# Patient Record
Sex: MALE | Race: BLACK OR AFRICAN AMERICAN
[De-identification: names, ages, dates, MRNs, and addresses within clinical notes are randomized per-mention and may not be internally consistent; named-entity substitution may affect disease eponyms.]

---

## 2018-03-19 ENCOUNTER — HOSPITAL ENCOUNTER (INPATIENT)
Dept: HOSPITAL 36 - GERO | Age: 68
LOS: 15 days | Discharge: HOME | DRG: 885 | End: 2018-04-03
Attending: PSYCHIATRY & NEUROLOGY | Admitting: PSYCHIATRY & NEUROLOGY
Payer: MEDICARE

## 2018-03-19 VITALS — DIASTOLIC BLOOD PRESSURE: 75 MMHG | SYSTOLIC BLOOD PRESSURE: 170 MMHG

## 2018-03-19 DIAGNOSIS — H40.9: ICD-10-CM

## 2018-03-19 DIAGNOSIS — I10: ICD-10-CM

## 2018-03-19 DIAGNOSIS — F31.5: Primary | ICD-10-CM

## 2018-03-19 DIAGNOSIS — Z79.82: ICD-10-CM

## 2018-03-19 DIAGNOSIS — M19.90: ICD-10-CM

## 2018-03-19 PROCEDURE — Z7610: HCPCS

## 2018-03-19 PROCEDURE — G0410 GRP PSYCH PARTIAL HOSP 45-50: HCPCS

## 2018-03-20 NOTE — DIAGNOSTIC IMAGING REPORT
CHEST X-RAY: AP view



INDICATION: Pneumonia



COMPARISON: None



FINDINGS: There appear to be overlying including material limiting the

exam.  There is no focal consolidation or pleural effusions The heart is

normal in size. The osseous structures demonstrate no acute

abnormalities.



IMPRESSION: 



Overlying clothing material, limiting the exam.  No focal consolidation

is identified.

## 2018-03-20 NOTE — HISTORY AND PHYSICAL
History of Present Illness





- HPI


Chief Complaint: 


agitation 











HPI: 





This is a 8 year old male who is admitted from home due to agitation towards 

family members. 


Vital Signs: 





 Last Vital Signs











Temp  97.6 F   03/20/18 15:18


 


Pulse  65   03/20/18 15:18


 


Resp  20   03/20/18 15:18


 


BP  176/79   03/20/18 15:18


 


Pulse Ox  97   03/20/18 15:18














Past Medical History


Cardiovascular: Report: HTN





Social History


Smoke: No


Alcohol: None


Drugs: None


Lives: With Family





- Medications


Home Medications: 


Home Medication











 Medication  Instructions  Recorded  Type


 


Aspirin [Ecotrin] 81 mg PO DAILY 03/19/18 History


 


Latanoprost 0.005% Ophth Soln 1 drop EACH EYE HS 03/19/18 History





[Xalatan 0.005% Ophth Soln]   


 


Metoprolol Tartrate [Lopressor] 25 mg PO BID 03/19/18 History


 


QUEtiapine Fumarate [SEROquel] 800 mg PO HS 03/19/18 History


 


amLODIPine Besylate [Norvasc*] 10 mg PO DAILY 03/19/18 History














- Allergies


Allergies/Adverse Reactions: 


 Allergies











Allergy/AdvReac Type Severity Reaction Status Date / Time


 


No Known Allergies Allergy   Verified 03/19/18 17:43














Review of Systems





- Review of Systems


Constitutional: Report: No Significant


Eyes: Report: No Significant


Respiratory: Report: No Significant


Cardiovascular: Report: No Significant


Neurological: Report: No Significant





Physical Exam





- Physical Exam


HEENT: Report: Ears Nose Throat within normal limits


Neck: Report: Within normal limits


Cardiovascular Systems: Report: +s1/s2 noted, Regular, Rate and Rhythm


Respiratory: Report: Breath Sounds are within normal limits


Abdomen: Report: Non-tender to palpation


Skin: Report: Color of skin is within normal limits


Neuro/Psych: Report: Mood affect is within normal limits





- Assessment


Assessment: 





agitation


htn 








- Plan


Plan: 





continue current orders
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
## 2018-03-21 NOTE — PROGRESS NOTES
DATE:  



SUBJECTIVE:  Chart reviewed and the patient interviewed.  I also discussed the

patient's condition with the staff and reviewed records and labs.  The patient

is still hallucinating and the patient said that "there is liquid coming out of

my nipples."  The patient also is still restless and is still anxious.  He also

has been having episodes of confusion and needed redirections.  The patient also

still has episodes of anger and trying to hit others.  Otherwise, the patient is

compliant with taking his medications and the patient is started on Seroquel 800

mg at bedtime with no side effects.



ASSESSMENT:  The patient is still agitated and needs close monitoring and he

still can be dangerous to others.



TREATMENT PLAN:  Continue monitoring his behavior and his condition closely. 

Also, continue to work on his irritability and ineffective coping and continue

to follow up.





DD: 03/21/2018 06:28

DT: 03/21/2018 07:10

[facility]# [id number]  [id number]

## 2018-03-21 NOTE — GENERAL PROGRESS NOTE
Subjective





- Review of Systems


Events since last encounter: 





still irritable


otherwise no distress 





Objective





- Physical Exam


Vitals and I&O: 


 Vital Signs











Temp  97.6 F   03/21/18 06:44


 


Pulse  73   03/21/18 06:44


 


Resp  20   03/21/18 06:44


 


BP  155/74   03/21/18 06:44


 


Pulse Ox  98   03/21/18 06:44








 Intake & Output











 03/20/18 03/21/18 03/21/18





 18:59 06:59 18:59


 


Intake Total 1200 240 


 


Balance 1200 240 


 


Intake:   


 


  Oral 1200 240 


 


Other:   


 


  # Voids 3 2 


 


  Stool Characteristics  Soft 











Active Medications: 


Current Medications





Acetaminophen (Tylenol)  650 mg PO Q4HR PRN


   PRN Reason: Mild Pain / Temp above 100


   Stop: 05/18/18 18:35


Al Hydrox/Mg Hydrox/Simethicone (Maalox)  30 ml PO Q4HR PRN


   PRN Reason: GI DISTRESS


   Stop: 05/18/18 18:35


Amlodipine Besylate (Norvasc)  10 mg PO DAILY [facility]


   Stop: 05/19/18 08:59


   Last Admin: 03/20/18 08:37 Dose:  10 mg


Aspirin (Ecotrin)  81 mg PO DAILY [facility]


   Stop: 05/19/18 08:59


   Last Admin: 03/20/18 08:38 Dose:  81 mg


Latanoprost (Xalatan 0.005% Ophth Soln)  1 drop EACH EYE HS [facility]


   Stop: 05/18/18 20:59


   Last Admin: 03/20/18 21:02 Dose:  Not Given


Lorazepam (Ativan)  0.5 mg PO Q4HR PRN; Protocol


   PRN Reason: Anxiety


   Stop: 04/18/18 18:35


Magnesium Hydroxide (Milk Of Magnesia)  30 ml PO HS PRN


   PRN Reason: Constipation


Metoprolol Tartrate (Lopressor)  25 mg PO BID [facility]


   Stop: 05/19/18 08:59


   Last Admin: 03/20/18 17:10 Dose:  25 mg


Multivitamins/Vitamin C (Theragran)  1 tab PO DAILY [facility]


   Stop: 05/19/18 08:59


   Last Admin: 03/20/18 08:38 Dose:  1 tab


Quetiapine Fumarate (Seroquel)  800 mg PO HS [facility]


   PRN Reason: Protocol


   Stop: 05/19/18 20:59


   Last Admin: 03/20/18 21:03 Dose:  800 mg


Zolpidem Tartrate (Ambien)  5 mg PO HS PRN


   PRN Reason: Insomnia


   Stop: 05/18/18 18:35


   Last Admin: 03/19/18 22:07 Dose:  5 mg

## 2018-03-22 NOTE — GENERAL PROGRESS NOTE
Subjective





- Review of Systems


Events since last encounter: 





patient awake


seems confused


in no distress





Objective





- Physical Exam


Vitals and I&O: 


 Vital Signs











Temp  96.9 F   03/22/18 06:45


 


Pulse  71   03/22/18 06:45


 


Resp  21   03/22/18 06:45


 


BP  132/70   03/22/18 06:45


 


Pulse Ox  98   03/22/18 06:45








 Intake & Output











 03/21/18 03/22/18 03/22/18





 18:59 06:59 18:59


 


Intake Total  1660 


 


Balance  1660 


 


Intake:   


 


  Oral  1660 


 


Other:   


 


  # Voids  3 


 


  Stool Characteristics  Soft 











Active Medications: 


Current Medications





Acetaminophen (Tylenol)  650 mg PO Q4HR PRN


   PRN Reason: Mild Pain / Temp above 100


   Stop: 05/18/18 18:35


Al Hydrox/Mg Hydrox/Simethicone (Maalox)  30 ml PO Q4HR PRN


   PRN Reason: GI DISTRESS


   Stop: 05/18/18 18:35


Amlodipine Besylate (Norvasc)  10 mg PO DAILY [facility]


   Stop: 05/19/18 08:59


   Last Admin: 03/21/18 09:49 Dose:  10 mg


Aspirin (Ecotrin)  81 mg PO DAILY [facility]


   Stop: 05/19/18 08:59


   Last Admin: 03/21/18 09:49 Dose:  81 mg


Latanoprost (Xalatan 0.005% Ophth Soln)  1 drop EACH EYE HS [facility]


   Stop: 05/18/18 20:59


   Last Admin: 03/21/18 20:12 Dose:  Not Given


Lorazepam (Ativan)  0.5 mg PO Q4HR PRN; Protocol


   PRN Reason: Anxiety


   Stop: 04/18/18 18:35


Magnesium Hydroxide (Milk Of Magnesia)  30 ml PO HS PRN


   PRN Reason: Constipation


Metoprolol Tartrate (Lopressor)  25 mg PO BID [facility]


   Stop: 05/19/18 08:59


   Last Admin: 03/21/18 16:28 Dose:  25 mg


Multivitamins/Vitamin C (Theragran)  1 tab PO DAILY [facility]


   Stop: 05/19/18 08:59


   Last Admin: 03/21/18 09:49 Dose:  1 tab


Quetiapine Fumarate (Seroquel)  800 mg PO HS [facility]


   PRN Reason: Protocol


   Stop: 05/19/18 20:59


   Last Admin: 03/21/18 20:23 Dose:  800 mg


Zolpidem Tartrate (Ambien)  5 mg PO HS PRN


   PRN Reason: Insomnia


   Stop: 05/18/18 18:35


   Last Admin: 03/19/18 22:07 Dose:  5 mg
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
## 2018-03-23 NOTE — INTERNAL MEDICINE PROG NOTE
Internal Medicine Subjective





- Subjective


Service Date: 03/23/18


Patient seen and examined:: with staff


Patient is:: awake


Per staff patient has:: tolerating meds





Internal Medicine Objective





- Physical Exam


Vitals and I&O: 


 Vital Signs











Temp  98.0 F   03/23/18 14:51


 


Pulse  72   03/23/18 14:51


 


Resp  18   03/23/18 14:51


 


BP  148/81   03/23/18 14:51


 


Pulse Ox  96   03/23/18 14:51








 Intake & Output











 03/22/18 03/23/18 03/23/18





 18:59 06:59 18:59


 


Intake Total 1200 260 


 


Balance 1200 260 


 


Intake:   


 


  Oral 1200 260 


 


Other:   


 


  # Voids 3 1 


 


  # Bowel Movements 1  


 


  Stool Characteristics Soft  











Active Medications: 


Current Medications





Acetaminophen (Tylenol)  650 mg PO Q4HR PRN


   PRN Reason: Mild Pain / Temp above 100


   Stop: 05/18/18 18:35


Al Hydrox/Mg Hydrox/Simethicone (Maalox)  30 ml PO Q4HR PRN


   PRN Reason: GI DISTRESS


   Stop: 05/18/18 18:35


Amlodipine Besylate (Norvasc)  10 mg PO DAILY [facility]


   Stop: 05/19/18 08:59


   Last Admin: 03/23/18 08:29 Dose:  10 mg


Aspirin (Ecotrin)  81 mg PO DAILY [facility]


   Stop: 05/19/18 08:59


   Last Admin: 03/23/18 08:29 Dose:  81 mg


Latanoprost (Xalatan 0.005% Ophth Soln)  1 drop EACH EYE HS [facility]


   Stop: 05/18/18 20:59


   Last Admin: 03/22/18 20:54 Dose:  Not Given


Lorazepam (Ativan)  0.5 mg PO Q4HR PRN; Protocol


   PRN Reason: Anxiety


   Stop: 04/18/18 18:35


   Last Admin: 03/23/18 09:26 Dose:  0.5 mg


Magnesium Hydroxide (Milk Of Magnesia)  30 ml PO HS PRN


   PRN Reason: Constipation


Metoprolol Tartrate (Lopressor)  25 mg PO BID [facility]


   Stop: 05/19/18 08:59


   Last Admin: 03/23/18 08:29 Dose:  25 mg


Multivitamins/Vitamin C (Theragran)  1 tab PO DAILY [facility]


   Stop: 05/19/18 08:59


   Last Admin: 03/23/18 08:29 Dose:  1 tab


Quetiapine Fumarate (Seroquel)  800 mg PO HS [facility]


   PRN Reason: Protocol


   Stop: 05/19/18 20:59


   Last Admin: 03/22/18 20:37 Dose:  800 mg


Zolpidem Tartrate (Ambien)  5 mg PO HS PRN


   PRN Reason: Insomnia


   Stop: 05/18/18 18:35


   Last Admin: 03/22/18 20:37 Dose:  5 mg








General: alert


HEENT: NC/AT, PERRLA


Neck: Supple


Cardiovascular: RRR


Abdomen: soft, non-tender, non-distended, positive bowel sound


Neurological: alert





Internal Medicine Assmt/Plan





- Assessment


Assessment: 





agitation


htn 








- Plan


Plan: 





continue current orders

## 2018-03-24 NOTE — GENERAL PROGRESS NOTE
Subjective





- Review of Systems


Events since last encounter: 





patient still agitated


irritable


denies pain 





Objective





- Physical Exam


Vitals and I&O: 


 Vital Signs











Temp  97.7 F   18 06:42


 


Pulse  81   18 09:05


 


Resp  21   18 06:42


 


BP  163/98   18 09:05


 


Pulse Ox  99   18 06:42








 Intake & Output











 18





 18:59 06:59 18:59


 


Intake Total 1200 480 


 


Balance 1200 480 


 


Intake:   


 


  Oral 1200 480 


 


Other:   


 


  # Voids 4 1 


 


  # Bowel Movements 1  











Active Medications: 


Current Medications





Acetaminophen (Tylenol)  650 mg PO Q4HR PRN


   PRN Reason: Mild Pain / Temp above 100


   Stop: 18 18:35


Al Hydrox/Mg Hydrox/Simethicone (Maalox)  30 ml PO Q4HR PRN


   PRN Reason: GI DISTRESS


   Stop: 18 18:35


Amlodipine Besylate (Norvasc)  10 mg PO DAILY [facility]


   Stop: 18 08:59


   Last Admin: 18 09:05 Dose:  10 mg


Aspirin (Ecotrin)  81 mg PO DAILY [facility]


   Stop: 18 08:59


   Last Admin: 18 09:04 Dose:  81 mg


Latanoprost (Xalatan 0.005% Ophth Soln)  1 drop EACH EYE HS [facility]


   Stop: 18 20:59


   Last Admin: 18 20:15 Dose:  Not Given


Lorazepam (Ativan)  0.5 mg PO Q4HR PRN; Protocol


   PRN Reason: Anxiety


   Stop: 18 18:35


   Last Admin: 18 10:31 Dose:  0.5 mg


Magnesium Hydroxide (Milk Of Magnesia)  30 ml PO HS PRN


   PRN Reason: Constipation


Metoprolol Tartrate (Lopressor)  25 mg PO BID [facility]


   Stop: 18 08:59


   Last Admin: 18 09:04 Dose:  25 mg


Multivitamins/Vitamin C (Theragran)  1 tab PO DAILY [facility]


   Stop: 18 08:59


   Last Admin: 18 09:04 Dose:  1 tab


Quetiapine Fumarate (Seroquel)  800 mg PO HS [facility]


   PRN Reason: Protocol


   Stop: 18 20:59


   Last Admin: 18 20:13 Dose:  800 mg


Zolpidem Tartrate (Ambien)  5 mg PO HS PRN


   PRN Reason: Insomnia


   Stop: 18 18:35


   Last Admin: 18 20:37 Dose:  5 mg











Nutritional Asmnt/Malnutr-PDOC





- Dietary Evaluation


Malnutrition Findings (Please click <Entered> for more info): 








Nutritional Asmnt/Malnutrition                             Start:  18 09:

59


Text:                                                      Status: Complete    

  


Freq:                                                                          

  


 Document     18 10:00  ZHANG  (Rec: 18 10:05  [facility]  SAMANTHA-

FNS1)


 Nutritional Asmnt/Malnutrition


     Patient General Information


      Diagnosis                                  Psychosis Nos


      Pertinent Medical Hx/Surgical Hx           HTN


      Subjective Information                     Pt listening to music rec room


                                                 , no nutrition concerns at


                                                 thist time.


      Current Diet Order/ Nutrition Support      Cardiac diet


      Pertinent Medications                      MOM, theragran


      Pertinent Labs                             none noted as of 3/24 @ 1002


     Nutritional Hx/Data


      Height                                     1.8 m


      Height (Calculated Centimeters)            180.3


      Current Weight (lbs)                       81.647 kg


      Weight (Calculated Kilograms)              81.6


      Weight (Calculated Grams)                  70818.6


      Body Mass Index (BMI)                      25.1


      Weight Status                              Overweight


     GI Symptoms


      GI Symptoms                                None


      Last BM                                    3/23


      Food Allergies                             No


      Cultural/Ethnic/Nondenominational Belief           None noted


      Usual diet at home                         Unable to report


      Skin Integrity/Comment:                    chelita score 22, intact


     Estimated Nutritional Goals


      BEE in Kcals:                              Using Current wt


      Calories/Kcals/Kg                          25kcals/kg


      Kcals Calculated                           2050kcals/day


      Protein:                                   Using Current wt


      Protein g/kg/kg


      Protein Calculated                         82g/day


      Fluid: ml                                  2050ml/day (1ml/kcal)


     Nutritional Problem


      No current Nutrition Prob


       Problem                                   No nutrition concerns at this


                                                 time


     Intervention/Recommendation


      Comments                                   Recommend continuing Cardiac


                                                 dietq


     Expected Outcomes/Goals


      Expected Outcomes/Goals                    PO Intake >75%
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
## 2018-03-25 NOTE — GENERAL PROGRESS NOTE
Subjective





- Review of Systems


Events since last encounter: 





patient confused  


disorganized 


irritable 


no distress 





Objective





- Physical Exam


Vitals and I&O: 


 Vital Signs











Temp  97.7 F   18 06:19


 


Pulse  82   18 09:26


 


Resp  20   18 06:19


 


BP  161/89   18 09:26


 


Pulse Ox  98   18 06:19








 Intake & Output











 18





 18:59 06:59 18:59


 


Intake Total 1600 780 


 


Balance 1600 780 


 


Intake:   


 


  Oral 1600 780 


 


Other:   


 


  # Voids 4 1 


 


  # Bowel Movements 1  











Active Medications: 


Current Medications





Acetaminophen (Tylenol)  650 mg PO Q4HR PRN


   PRN Reason: Mild Pain / Temp above 100


   Stop: 18 18:35


Al Hydrox/Mg Hydrox/Simethicone (Maalox)  30 ml PO Q4HR PRN


   PRN Reason: GI DISTRESS


   Stop: 18 18:35


Amlodipine Besylate (Norvasc)  10 mg PO DAILY [facility]


   Stop: 18 08:59


   Last Admin: 18 09:28 Dose:  10 mg


Aspirin (Ecotrin)  81 mg PO DAILY [facility]


   Stop: 18 08:59


   Last Admin: 18 09:26 Dose:  81 mg


Latanoprost (Xalatan 0.005% Ophth Soln)  1 drop EACH EYE HS [facility]


   Stop: 18 20:59


   Last Admin: 18 21:30 Dose:  Not Given


Lorazepam (Ativan)  0.5 mg PO Q4HR PRN; Protocol


   PRN Reason: Anxiety


   Stop: 18 18:35


   Last Admin: 18 20:18 Dose:  0.5 mg


Magnesium Hydroxide (Milk Of Magnesia)  30 ml PO HS PRN


   PRN Reason: Constipation


Metoprolol Tartrate (Lopressor)  25 mg PO BID [facility]


   Stop: 18 08:59


   Last Admin: 18 09:26 Dose:  25 mg


Multivitamins/Vitamin C (Theragran)  1 tab PO DAILY [facility]


   Stop: 18 08:59


   Last Admin: 18 09:26 Dose:  1 tab


Quetiapine Fumarate (Seroquel)  800 mg PO HS [facility]


   PRN Reason: Protocol


   Stop: 18 20:59


   Last Admin: 18 20:18 Dose:  800 mg


Zolpidem Tartrate (Ambien)  5 mg PO HS PRN


   PRN Reason: Insomnia


   Stop: 18 18:35


   Last Admin: 18 20:17 Dose:  5 mg











Nutritional Asmnt/Malnutr-PDOC





- Dietary Evaluation


Malnutrition Findings (Please click <Entered> for more info): 








Nutritional Asmnt/Malnutrition                             Start:  18 09:

59


Text:                                                      Status: Complete    

  


Freq:                                                                          

  


 Document     18 10:00  ZHANG  (Rec: 18 10:05  EGRIFFITH  SAMANTHA-

FNS1)


 Nutritional Asmnt/Malnutrition


     Patient General Information


      Diagnosis                                  Psychosis Nos


      Pertinent Medical Hx/Surgical Hx           HTN


      Subjective Information                     Pt listening to music rec room


                                                 , no nutrition concerns at


                                                 thist time.


      Current Diet Order/ Nutrition Support      Cardiac diet


      Pertinent Medications                      MOM, theragran


      Pertinent Labs                             none noted as of 3/24 @ 1002


     Nutritional Hx/Data


      Height                                     1.8 m


      Height (Calculated Centimeters)            180.3


      Current Weight (lbs)                       81.647 kg


      Weight (Calculated Kilograms)              81.6


      Weight (Calculated Grams)                  35017.6


      Body Mass Index (BMI)                      25.1


      Weight Status                              Overweight


     GI Symptoms


      GI Symptoms                                None


      Last BM                                    3/23


      Food Allergies                             No


      Cultural/Ethnic/Denominational Belief           None noted


      Usual diet at home                         Unable to report


      Skin Integrity/Comment:                    chelita score 22, intact


     Estimated Nutritional Goals


      BEE in Kcals:                              Using Current wt


      Calories/Kcals/Kg                          25kcals/kg


      Kcals Calculated                           2050kcals/day


      Protein:                                   Using Current wt


      Protein g/kg/kg


      Protein Calculated                         82g/day


      Fluid: ml                                  2050ml/day (1ml/kcal)


     Nutritional Problem


      No current Nutrition Prob


       Problem                                   No nutrition concerns at this


                                                 time


     Intervention/Recommendation


      Comments                                   Recommend continuing Cardiac


                                                 dietq


     Expected Outcomes/Goals


      Expected Outcomes/Goals                    PO Intake >75%

## 2018-03-26 NOTE — PROGRESS NOTES
DATE:  03/25/2018



SUBJECTIVE:  The patient was seen and evaluated.  The patient's chart reviewed.



Covering for Dr. Messina.



Today on face-to-face evaluation, the patient denies any side effects of

medications, continues to observe some mild thought blocking and derails with

conversation, becomes disorganized, a little more redirectable compared to

yesterday.



MENTAL STATUS EXAMINATION:  Residual voices.  The patient's behavior continues

to persist, but a little more redirectable compared to yesterday.



ASSESSMENT AND PLAN:  A 68-year-old male with a history of schizophrenia, who

continues to respond to voices.  We will continue with the current medications

of Seroquel 800 mg a day and he is starting to show some mild improvement

compared to yesterday.  His medications at this point are maximized to target

the patient's residual and chronic paranoid and voices.





DD: 03/25/2018 08:38

DT: 03/26/2018 01:46

JOB# [id number]  [id number]
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
## 2018-03-26 NOTE — GENERAL PROGRESS NOTE
Subjective





- Review of Systems


Events since last encounter: 





patient  confused


in no distress 





Objective





- Physical Exam


Vitals and I&O: 


 Vital Signs











Temp  98.0 F   18 14:00


 


Pulse  75   18 15:05


 


Resp  20   18 14:00


 


BP  170/89   18 14:00


 


Pulse Ox  97   18 14:00








 Intake & Output











 18





 18:59 06:59 18:59


 


Intake Total 1100 300 


 


Balance 1100 300 


 


Intake:   


 


  Oral 1100 300 


 


Other:   


 


  # Voids  2 


 


  # Bowel Movements  0 











Active Medications: 


Current Medications





Acetaminophen (Tylenol)  650 mg PO Q4HR PRN


   PRN Reason: Mild Pain / Temp above 100


   Stop: 18 18:35


Al Hydrox/Mg Hydrox/Simethicone (Maalox)  30 ml PO Q4HR PRN


   PRN Reason: GI DISTRESS


   Stop: 18 18:35


Amlodipine Besylate (Norvasc)  10 mg PO DAILY [facility]


   Stop: 18 08:59


   Last Admin: 18 08:53 Dose:  10 mg


Aspirin (Ecotrin)  81 mg PO DAILY [facility]


   Stop: 18 08:59


   Last Admin: 18 08:52 Dose:  81 mg


Latanoprost (Xalatan 0.005% Ophth Soln)  1 drop EACH EYE HS [facility]


   Stop: 18 20:59


   Last Admin: 18 22:04 Dose:  Not Given


Lorazepam (Ativan)  0.5 mg PO Q4HR PRN; Protocol


   PRN Reason: Anxiety


   Stop: 18 18:35


   Last Admin: 18 13:20 Dose:  0.5 mg


Magnesium Hydroxide (Milk Of Magnesia)  30 ml PO HS PRN


   PRN Reason: Constipation


Metoprolol Tartrate (Lopressor)  25 mg PO BID [facility]


   Stop: 18 08:59


   Last Admin: 18 08:52 Dose:  25 mg


Multivitamins/Vitamin C (Theragran)  1 tab PO DAILY [facility]


   Stop: 18 08:59


   Last Admin: 18 08:51 Dose:  1 tab


Quetiapine Fumarate (Seroquel)  800 mg PO HS [facility]


   PRN Reason: Protocol


   Stop: 18 20:59


   Last Admin: 18 21:16 Dose:  800 mg


Zolpidem Tartrate (Ambien)  5 mg PO HS PRN


   PRN Reason: Insomnia


   Stop: 18 18:35


   Last Admin: 18 22:01 Dose:  5 mg











Nutritional Asmnt/Malnutr-PDOC





- Dietary Evaluation


Malnutrition Findings (Please click <Entered> for more info): 








Nutritional Asmnt/Malnutrition                             Start:  18 09:

59


Text:                                                      Status: Complete    

  


Freq:                                                                          

  


 Document     18 10:00  ZHANG  (Rec: 18 10:05  [facility]  SAMANTHA[facility]

FNS1)


 Nutritional Asmnt/Malnutrition


     Patient General Information


      Diagnosis                                  Psychosis Nos


      Pertinent Medical Hx/Surgical Hx           HTN


      Subjective Information                     Pt listening to music rec room


                                                 , no nutrition concerns at


                                                 thist time.


      Current Diet Order/ Nutrition Support      Cardiac diet


      Pertinent Medications                      MOM, theragran


      Pertinent Labs                             none noted as of 3/24 @ 1002


     Nutritional Hx/Data


      Height                                     1.8 m


      Height (Calculated Centimeters)            180.3


      Current Weight (lbs)                       81.647 kg


      Weight (Calculated Kilograms)              81.6


      Weight (Calculated Grams)                  32411.6


      Body Mass Index (BMI)                      25.1


      Weight Status                              Overweight


     GI Symptoms


      GI Symptoms                                None


      Last BM                                    3/23


      Food Allergies                             No


      Cultural/Ethnic/Yazdanism Belief           None noted


      Usual diet at home                         Unable to report


      Skin Integrity/Comment:                    chelita score 22, intact


     Estimated Nutritional Goals


      BEE in Kcals:                              Using Current wt


      Calories/Kcals/Kg                          25kcals/kg


      Kcals Calculated                           2050kcals/day


      Protein:                                   Using Current wt


      Protein g/kg/kg


      Protein Calculated                         82g/day


      Fluid: ml                                  2050ml/day (1ml/kcal)


     Nutritional Problem


      No current Nutrition Prob


       Problem                                   No nutrition concerns at this


                                                 time


     Intervention/Recommendation


      Comments                                   Recommend continuing Cardiac


                                                 dietq


     Expected Outcomes/Goals


      Expected Outcomes/Goals                    PO Intake >75%

## 2018-03-27 NOTE — PROGRESS NOTES
DATE:  03/27/2018



Covering for Dr. Messina.



Case was discussed with staff of the patient and reviewed records.  The patient

continues to be labile.  Continues to be unpredictable, impulsive.  Continues to

have poor insight about his dangerous aggressive behavior prior to coming here,

extremely aggressive.  He has been compliant with the medication with no side

effects, no sedation, no nausea, no extrapyramidal symptoms.  The staff report

that he is less labile.  We will continue with the patient in group therapy,

milieu therapy, and adjust the medications as needed.





DD: 03/27/2018 10:58

DT: 03/27/2018 22:52

JOB# [id number]  [id number]

## 2018-03-27 NOTE — INTERNAL MEDICINE PROG NOTE
Internal Medicine Subjective





- Subjective


Service Date: 18


Patient is:: awake


Per staff patient has:: tolerating meds





Internal Medicine Objective





- Physical Exam


Vitals and I&O: 


 Vital Signs











Temp  96.8 F   18 06:28


 


Pulse  73   18 08:39


 


Resp  20   18 12:39


 


BP  140/80   18 08:39


 


Pulse Ox  99   18 06:28








 Intake & Output











 18





 18:59 06:59 18:59


 


Intake Total 1200 240 


 


Balance 1200 240 


 


Intake:   


 


  Oral 1200 240 


 


Other:   


 


  # Voids 4 3 


 


  # Bowel Movements 1 0 











Active Medications: 


Current Medications





Acetaminophen (Tylenol)  650 mg PO Q4HR PRN


   PRN Reason: Mild Pain / Temp above 100


   Stop: 18 18:35


Al Hydrox/Mg Hydrox/Simethicone (Maalox)  30 ml PO Q4HR PRN


   PRN Reason: GI DISTRESS


   Stop: 18 18:35


Amlodipine Besylate (Norvasc)  10 mg PO DAILY [facility]


   Stop: 18 08:59


   Last Admin: 18 08:39 Dose:  10 mg


Aspirin (Ecotrin)  81 mg PO DAILY [facility]


   Stop: 18 08:59


   Last Admin: 18 08:40 Dose:  81 mg


Latanoprost (Xalatan 0.005% Ophth Soln)  1 drop EACH EYE HS [facility]


   Stop: 18 20:59


   Last Admin: 18 21:00 Dose:  Not Given


Lorazepam (Ativan)  0.5 mg PO Q4HR PRN; Protocol


   PRN Reason: Anxiety


   Stop: 18 18:35


   Last Admin: 18 08:40 Dose:  0.5 mg


Magnesium Hydroxide (Milk Of Magnesia)  30 ml PO HS PRN


   PRN Reason: Constipation


Metoprolol Tartrate (Lopressor)  25 mg PO BID [facility]


   Stop: 18 08:59


   Last Admin: 18 08:38 Dose:  25 mg


Multivitamins/Vitamin C (Theragran)  1 tab PO DAILY [facility]


   Stop: 18 08:59


   Last Admin: 18 08:39 Dose:  1 tab


Quetiapine Fumarate (Seroquel)  800 mg PO HS [facility]


   PRN Reason: Protocol


   Stop: 18 20:59


   Last Admin: 18 21:24 Dose:  800 mg


Zolpidem Tartrate (Ambien)  5 mg PO HS PRN


   PRN Reason: Insomnia


   Stop: 18 18:35


   Last Admin: 18 21:24 Dose:  5 mg








General: alert


HEENT: NC/AT, PERRLA


Neck: Supple


Cardiovascular: RRR


Abdomen: soft, non-tender, non-distended, positive bowel sound


Neurological: alert





Internal Medicine Assmt/Plan





- Assessment


Assessment: 





agitation


htn 








- Plan


Plan: 





continue current orders 





Nutritional Asmnt/Malnutr-PDOC





- Dietary Evaluation


Malnutrition Findings (Please click <Entered> for more info): 








Nutritional Asmnt/Malnutrition                             Start:  18 09:

59


Text:                                                      Status: Complete    

  


Freq:                                                                          

  


 Document     18 10:00  ZHANG  (Rec: 18 10:05  ZHANG SINGH-

FNS1)


 Nutritional Asmnt/Malnutrition


     Patient General Information


      Diagnosis                                  Psychosis Nos


      Pertinent Medical Hx/Surgical Hx           HTN


      Subjective Information                     Pt listening to music rec room


                                                 , no nutrition concerns at


                                                 thist time.


      Current Diet Order/ Nutrition Support      Cardiac diet


      Pertinent Medications                      MOM, theragran


      Pertinent Labs                             none noted as of 3/24 @ 1002


     Nutritional Hx/Data


      Height                                     5 ft 11 in


      Height (Calculated Centimeters)            180.3


      Current Weight (lbs)                       180 lb


      Weight (Calculated Kilograms)              81.6


      Weight (Calculated Grams)                  01333.6


      Body Mass Index (BMI)                      25.1


      Weight Status                              Overweight


     GI Symptoms


      GI Symptoms                                None


      Last BM                                    3/23


      Food Allergies                             No


      Cultural/Ethnic/Spiritism Belief           None noted


      Usual diet at home                         Unable to report


      Skin Integrity/Comment:                    chelita score 22, intact


     Estimated Nutritional Goals


      BEE in Kcals:                              Using Current wt


      Calories/Kcals/Kg                          25kcals/kg


      Kcals Calculated                           2050kcals/day


      Protein:                                   Using Current wt


      Protein g/kg/kg


      Protein Calculated                         82g/day


      Fluid: ml                                  2050ml/day (1ml/kcal)


     Nutritional Problem


      No current Nutrition Prob


       Problem                                   No nutrition concerns at this


                                                 time


     Intervention/Recommendation


      Comments                                   Recommend continuing Cardiac


                                                 dietq


     Expected Outcomes/Goals


      Expected Outcomes/Goals                    PO Intake >75%

## 2018-03-28 NOTE — PROGRESS NOTES
DATE:  



Chart reviewed and the patient interviewed.  Also discussed the patient's

condition with the staff and reviewed records and labs.  The patient is guarded

and he still has flat affect.  The patient also has episodes of being

argumentative.  Also, still has episodes of agitation.  Otherwise, the patient

continued to comply with taking Seroquel and he seems to be slightly calmer and

seems to be slightly more relaxed and less agitated.



ASSESSMENT:  The patient continued to show some improvement.



TREATMENT PLAN:  Will continue monitoring his behavior and his condition closely

and continue to work on his irritability and agitation and will continue to

follow up.





DD: 03/28/2018 20:01

DT: 03/28/2018 21:46

JOB# [id number]  [id number]

## 2018-03-28 NOTE — GENERAL PROGRESS NOTE
Subjective





- Review of Systems


Events since last encounter: 





patient awake


irritable


no signs of pain 





Objective





- Physical Exam


Vitals and I&O: 


 Vital Signs











Temp  97.5 F   18 06:17


 


Pulse  90   18 08:31


 


Resp  20   18 06:17


 


BP  170/84   18 08:31


 


Pulse Ox  100   18 06:17








 Intake & Output











 18





 18:59 06:59 18:59


 


Intake Total 1000 660 


 


Balance 1000 660 


 


Intake:   


 


  Oral 1000 660 


 


Other:   


 


  # Voids 4 2 


 


  # Bowel Movements 1 0 











Active Medications: 


Current Medications





Acetaminophen (Tylenol)  650 mg PO Q4HR PRN


   PRN Reason: Mild Pain / Temp above 100


   Stop: 18 18:35


Al Hydrox/Mg Hydrox/Simethicone (Maalox)  30 ml PO Q4HR PRN


   PRN Reason: GI DISTRESS


   Stop: 18 18:35


Amlodipine Besylate (Norvasc)  10 mg PO DAILY [facility]


   Stop: 18 08:59


   Last Admin: 18 08:31 Dose:  10 mg


Aspirin (Ecotrin)  81 mg PO DAILY [facility]


   Stop: 18 08:59


   Last Admin: 18 08:31 Dose:  81 mg


Latanoprost (Xalatan 0.005% Ophth Soln)  1 drop EACH EYE HS [facility]


   Stop: 18 20:59


   Last Admin: 18 22:00 Dose:  Not Given


Lorazepam (Ativan)  0.5 mg PO Q4HR PRN; Protocol


   PRN Reason: Anxiety


   Stop: 18 18:35


   Last Admin: 18 08:31 Dose:  0.5 mg


Magnesium Hydroxide (Milk Of Magnesia)  30 ml PO HS PRN


   PRN Reason: Constipation


Metoprolol Tartrate (Lopressor)  25 mg PO BID [facility]


   Stop: 18 08:59


   Last Admin: 18 08:31 Dose:  25 mg


Multivitamins/Vitamin C (Theragran)  1 tab PO DAILY [facility]


   Stop: 18 08:59


   Last Admin: 18 08:31 Dose:  1 tab


Quetiapine Fumarate (Seroquel)  800 mg PO HS [facility]


   PRN Reason: Protocol


   Stop: 18 20:59


   Last Admin: 18 21:29 Dose:  800 mg


Zolpidem Tartrate (Ambien)  5 mg PO HS PRN


   PRN Reason: Insomnia


   Stop: 18 18:35


   Last Admin: 18 21:30 Dose:  5 mg








General: No acute distress


HEENT: Atraumatic


Neck: Supple


Cardiovascular: Regular rate, Normal S1, Normal S2


Lungs: Clear to auscultation


Abdomen: Bowel sounds





Assessment/Plan





- Problem List


Patient Problems: 


All Active Problems





Agitation (Acute) R45.1


HTN (hypertension) (Acute) I10











- Plan


Plan: 





as per psych


will monitor 





Nutritional Asmnt/Malnutr-PDOC





- Dietary Evaluation


Malnutrition Findings (Please click <Entered> for more info): 








Nutritional Asmnt/Malnutrition                             Start:  18 09:

59


Text:                                                      Status: Complete    

  


Freq:                                                                          

  


 Document     18 10:00  ZHANG  (Rec: 18 10:05  ZHANG SINGH

FNS1)


 Nutritional Asmnt/Malnutrition


     Patient General Information


      Diagnosis                                  Psychosis Nos


      Pertinent Medical Hx/Surgical Hx           HTN


      Subjective Information                     Pt listening to music rec room


                                                 , no nutrition concerns at


                                                 thist time.


      Current Diet Order/ Nutrition Support      Cardiac diet


      Pertinent Medications                      MOM, theragran


      Pertinent Labs                             none noted as of 3/24 @ 1002


     Nutritional Hx/Data


      Height                                     1.8 m


      Height (Calculated Centimeters)            180.3


      Current Weight (lbs)                       81.647 kg


      Weight (Calculated Kilograms)              81.6


      Weight (Calculated Grams)                  02193.6


      Body Mass Index (BMI)                      25.1


      Weight Status                              Overweight


     GI Symptoms


      GI Symptoms                                None


      Last BM                                    3/23


      Food Allergies                             No


      Cultural/Ethnic/Restoration Belief           None noted


      Usual diet at home                         Unable to report


      Skin Integrity/Comment:                    chelita score 22, intact


     Estimated Nutritional Goals


      BEE in Kcals:                              Using Current wt


      Calories/Kcals/Kg                          25kcals/kg


      Kcals Calculated                           2050kcals/day


      Protein:                                   Using Current wt


      Protein g/kg/kg


      Protein Calculated                         82g/day


      Fluid: ml                                  2050ml/day (1ml/kcal)


     Nutritional Problem


      No current Nutrition Prob


       Problem                                   No nutrition concerns at this


                                                 time


     Intervention/Recommendation


      Comments                                   Recommend continuing Cardiac


                                                 dietq


     Expected Outcomes/Goals


      Expected Outcomes/Goals                    PO Intake >75%

## 2018-03-29 NOTE — GENERAL PROGRESS NOTE
Subjective





- Review of Systems


Events since last encounter: 





in no distress


improving 





Objective





- Physical Exam


Vitals and I&O: 


 Vital Signs











Temp  97.2 F   18 06:40


 


Pulse  75   18 09:17


 


Resp  20   18 06:40


 


BP  154/86   18 09:17


 


Pulse Ox  98   18 06:40








 Intake & Output











 18





 18:59 06:59 18:59


 


Intake Total 1200 120 


 


Balance 1200 120 


 


Intake:   


 


  Oral 1200 120 


 


Other:   


 


  # Voids 3 3 


 


  # Bowel Movements 1  











Active Medications: 


Current Medications





Acetaminophen (Tylenol)  650 mg PO Q4HR PRN


   PRN Reason: Mild Pain / Temp above 100


   Stop: 18 18:35


Al Hydrox/Mg Hydrox/Simethicone (Maalox)  30 ml PO Q4HR PRN


   PRN Reason: GI DISTRESS


   Stop: 18 18:35


Amlodipine Besylate (Norvasc)  10 mg PO DAILY [facility]


   Stop: 18 08:59


   Last Admin: 18 09:17 Dose:  10 mg


Aspirin (Ecotrin)  81 mg PO DAILY [facility]


   Stop: 18 08:59


   Last Admin: 18 09:15 Dose:  81 mg


Latanoprost (Xalatan 0.005% Ophth Soln)  1 drop EACH EYE HS [facility]


   Stop: 18 20:59


   Last Admin: 18 22:42 Dose:  Not Given


Lorazepam (Ativan)  0.5 mg PO Q4HR PRN; Protocol


   PRN Reason: Anxiety


   Stop: 18 18:35


   Last Admin: 18 11:09 Dose:  0.5 mg


Magnesium Hydroxide (Milk Of Magnesia)  30 ml PO HS PRN


   PRN Reason: Constipation


Metoprolol Tartrate (Lopressor)  25 mg PO BID [facility]


   Stop: 18 08:59


   Last Admin: 18 09:16 Dose:  25 mg


Multivitamins/Vitamin C (Theragran)  1 tab PO DAILY [facility]


   Stop: 18 08:59


   Last Admin: 18 09:16 Dose:  1 tab


Quetiapine Fumarate (Seroquel)  800 mg PO HS [facility]


   PRN Reason: Protocol


   Stop: 18 20:59


   Last Admin: 18 20:27 Dose:  800 mg


Zolpidem Tartrate (Ambien)  5 mg PO HS PRN


   PRN Reason: Insomnia


   Stop: 18 18:35


   Last Admin: 18 20:27 Dose:  5 mg








General: No acute distress


HEENT: Atraumatic


Neck: Supple


Cardiovascular: Regular rate, Normal S1, Normal S2


Lungs: Clear to auscultation


Abdomen: Bowel sounds





Assessment/Plan





- Problem List


Patient Problems: 


All Active Problems





Agitation (Acute) R45.1


HTN (hypertension) (Acute) I10











- Plan


Plan: 





as per psych


will monitor 





Nutritional Asmnt/Malnutr-PDOC





- Dietary Evaluation


Malnutrition Findings (Please click <Entered> for more info): 








Nutritional Asmnt/Malnutrition                             Start:  18 09:

59


Text:                                                      Status: Complete    

  


Freq:                                                                          

  


 Document     18 10:00  ZHANG  (Rec: 18 10:05  ZHANG SINGH-

FNS1)


 Nutritional Asmnt/Malnutrition


     Patient General Information


      Diagnosis                                  Psychosis Nos


      Pertinent Medical Hx/Surgical Hx           HTN


      Subjective Information                     Pt listening to music rec room


                                                 , no nutrition concerns at


                                                 thist time.


      Current Diet Order/ Nutrition Support      Cardiac diet


      Pertinent Medications                      MOM, theragran


      Pertinent Labs                             none noted as of 3/24 @ 1002


     Nutritional Hx/Data


      Height                                     1.8 m


      Height (Calculated Centimeters)            180.3


      Current Weight (lbs)                       81.647 kg


      Weight (Calculated Kilograms)              81.6


      Weight (Calculated Grams)                  60806.6


      Body Mass Index (BMI)                      25.1


      Weight Status                              Overweight


     GI Symptoms


      GI Symptoms                                None


      Last BM                                    3/23


      Food Allergies                             No


      Cultural/Ethnic/M[employer]on Belief           None noted


      Usual diet at home                         Unable to report


      Skin Integrity/Comment:                    chelita score 22, intact


     Estimated Nutritional Goals


      BEE in Kcals:                              Using Current wt


      Calories/Kcals/Kg                          25kcals/kg


      Kcals Calculated                           2050kcals/day


      Protein:                                   Using Current wt


      Protein g/kg/kg


      Protein Calculated                         82g/day


      Fluid: ml                                  2050ml/day (1ml/kcal)


     Nutritional Problem


      No current Nutrition Prob


       Problem                                   No nutrition concerns at this


                                                 time


     Intervention/Recommendation


      Comments                                   Recommend continuing Cardiac


                                                 dietq


     Expected Outcomes/Goals


      Expected Outcomes/Goals                    PO Intake >75%

## 2018-03-30 NOTE — INTERNAL MEDICINE PROG NOTE
Internal Medicine Subjective





- Subjective


Service Date: 18


Patient is:: awake


Per staff patient has:: tolerating meds





Internal Medicine Objective





- Physical Exam


Vitals and I&O: 


 Vital Signs











Temp  97.1 F   18 06:47


 


Pulse  69   18 09:28


 


Resp  20   18 06:47


 


BP  150/83   18 09:28


 


Pulse Ox  99   18 06:47








 Intake & Output











 18





 18:59 06:59 18:59


 


Intake Total 1200 240 


 


Balance 1200 240 


 


Intake:   


 


  Oral 1200 240 


 


Other:   


 


  # Voids 3 1 


 


  # Bowel Movements 1  











Active Medications: 


Current Medications





Acetaminophen (Tylenol)  650 mg PO Q4HR PRN


   PRN Reason: Mild Pain / Temp above 100


   Stop: 18 18:35


Al Hydrox/Mg Hydrox/Simethicone (Maalox)  30 ml PO Q4HR PRN


   PRN Reason: GI DISTRESS


   Stop: 18 18:35


Amlodipine Besylate (Norvasc)  10 mg PO DAILY [facility]


   Stop: 18 08:59


   Last Admin: 18 09:28 Dose:  10 mg


Aspirin (Ecotrin)  81 mg PO DAILY [facility]


   Stop: 18 08:59


   Last Admin: 18 09:28 Dose:  81 mg


Latanoprost (Xalatan 0.005% Ophth Soln)  1 drop EACH EYE HS [facility]


   Stop: 18 20:59


   Last Admin: 18 21:26 Dose:  Not Given


Lorazepam (Ativan)  0.5 mg PO Q4HR PRN; Protocol


   PRN Reason: Anxiety


   Stop: 18 18:35


   Last Admin: 18 09:52 Dose:  0.5 mg


Magnesium Hydroxide (Milk Of Magnesia)  30 ml PO HS PRN


   PRN Reason: Constipation


Metoprolol Tartrate (Lopressor)  25 mg PO BID [facility]


   Stop: 18 08:59


   Last Admin: 18 09:27 Dose:  25 mg


Multivitamins/Vitamin C (Theragran)  1 tab PO DAILY [facility]


   Stop: 18 08:59


   Last Admin: 18 09:27 Dose:  1 tab


Quetiapine Fumarate (Seroquel)  800 mg PO HS [facility]


   PRN Reason: Protocol


   Stop: 18 20:59


   Last Admin: 18 20:18 Dose:  800 mg


Zolpidem Tartrate (Ambien)  5 mg PO HS PRN


   PRN Reason: Insomnia


   Stop: 18 18:35


   Last Admin: 18 20:18 Dose:  5 mg








General: alert


HEENT: NC/AT, PERRLA


Neck: Supple


Cardiovascular: RRR


Abdomen: soft, non-tender, non-distended, positive bowel sound


Neurological: alert





Internal Medicine Assmt/Plan





- Assessment


Assessment: 





agitation


htn 








- Plan


Plan: 





continue current orders 





Nutritional Asmnt/Malnutr-PDOC





- Dietary Evaluation


Malnutrition Findings (Please click <Entered> for more info): 








Nutritional Asmnt/Malnutrition                             Start:  18 09:

59


Text:                                                      Status: Complete    

  


Freq:                                                                          

  


 Document     18 10:00  ZHANG  (Rec: 18 10:05  ZHANG SINGH-

FNS1)


 Nutritional Asmnt/Malnutrition


     Patient General Information


      Diagnosis                                  Psychosis Nos


      Pertinent Medical Hx/Surgical Hx           HTN


      Subjective Information                     Pt listening to music rec room


                                                 , no nutrition concerns at


                                                 thist time.


      Current Diet Order/ Nutrition Support      Cardiac diet


      Pertinent Medications                      MOM, theragran


      Pertinent Labs                             none noted as of 3/24 @ 1002


     Nutritional Hx/Data


      Height                                     5 ft 11 in


      Height (Calculated Centimeters)            180.3


      Current Weight (lbs)                       180 lb


      Weight (Calculated Kilograms)              81.6


      Weight (Calculated Grams)                  00285.6


      Body Mass Index (BMI)                      25.1


      Weight Status                              Overweight


     GI Symptoms


      GI Symptoms                                None


      Last BM                                    3/23


      Food Allergies                             No


      Cultural/Ethnic/Pent[employer]al Belief           None noted


      Usual diet at home                         Unable to report


      Skin Integrity/Comment:                    chelita score 22, intact


     Estimated Nutritional Goals


      BEE in Kcals:                              Using Current wt


      Calories/Kcals/Kg                          25kcals/kg


      Kcals Calculated                           2050kcals/day


      Protein:                                   Using Current wt


      Protein g/kg/kg


      Protein Calculated                         82g/day


      Fluid: ml                                  2050ml/day (1ml/kcal)


     Nutritional Problem


      No current Nutrition Prob


       Problem                                   No nutrition concerns at this


                                                 time


     Intervention/Recommendation


      Comments                                   Recommend continuing Cardiac


                                                 dietq


     Expected Outcomes/Goals


      Expected Outcomes/Goals                    PO Intake >75%

## 2018-03-31 NOTE — PROGRESS NOTES
DATE:  03/31/2018



SUBJECTIVE:  A 68-year-old male admitted on a hold, evaluated by ,

tearing up the house, destruction of property, agitation, threatening physically

and verbally, apparently with history of bipolar over the past few days.  Dr. Messina has been seeing the patient, noting episodes of being argumentative,

episodes of agitation, highly impulsive and unpredictable; but on a positive

note, he has been taking his medications.  The patient is still complaining of

some anxiety.  Denies any medical problems, still needing some as needed

medications.



ASSESSMENT:  The patient remains unpredictable, impulsive, still with some

episodes of anger and anxiety, but he is significantly calmer versus his

admission.



Medications were noted.  He is on high doses of Seroquel.



PLAN:  We will continue to monitor, continue to adjust and titrate medications. 

Given his ongoing symptoms, he is not safe for discharge.





DD: 03/31/2018 06:23

DT: 03/31/2018 22:06

JOB# [id number]  [id number]

## 2018-03-31 NOTE — PROGRESS NOTES
DATE:  03/31/2018



SUBJECTIVE:  The patient was seen in the dining area, having breakfast.  The

patient appears to be guarded at this time.  No behavioral issues noted or

reported since last night.  Otherwise, the patient appears to be in no acute

distress.



OBJECTIVE:

VITAL SIGNS:  Temperature 98.2, heart rate of 74, respirations of 19, 97% on

room air, and blood pressure 167/90.

HEENT:  Head is atraumatic and normocephalic.  Eyes:  Bilateral conjunctivae are

clear.  Bilateral pupils are equally round and reactive.

NECK:  Supple.  No JVD.

CARDIOVASCULAR:  S1 and S2, without murmur.

PULMONARY:  Clear to auscultation.

GASTROINTESTINAL:  Soft and nontender without guarding.  Positive bowel sounds.

MUSCULOSKELETAL:  No clubbing.  No cyanosis noted.



ASSESSMENT:

1.  Bipolar disorder.

2.  Hypertension.

3.  Glaucoma.

4.  Osteoarthritis.



PLAN:  We will keep the patient inpatient Psychiatric Unit.  We will follow up

with a psychiatrist to monitor the patient's condition and behavior.  Treatment

plans were discussed with the patient's nurse.  Treatment plans were discussed

with Dr. Roman.





DD: 03/31/2018 07:52

DT: 03/31/2018 16:12

JOB# [id number]  [id number]

## 2018-04-01 NOTE — PROGRESS NOTES
DATE:  04/01/2018



SUBJECTIVE:  The patient admitted to the hospital on a hold.  Apparently, was

destroying the house, his sister, family; agitated, yelling.  The patient over

the past couple of days has remained with episodes of agitation and

irritability.  Overall, calmer versus initial assessment, refusing to speak with

me today.  The patient is eating fairly well.  Sleeping fairly well.  It seems

he has been medication compliant.  He does, however, per staff remains somewhat

impulsive, unpredictable and argumentative.



MEDICATIONS:  Noted.



ASSESSMENT:  The patient remains unpredictable, still angry, episodes of anger,

but seems to be somewhat calmer versus initial assessment on admission.



PLAN:  We will continue to monitor.  Continue Seroquel at fairly high doses.  We

will monitor and follow up.





DD: 04/01/2018 06:31

DT: 04/01/2018 16:57

JOB# [id number]  [id number]

## 2018-04-01 NOTE — GENERAL PROGRESS NOTE
Subjective





- Review of Systems


Events since last encounter: 





awake


in no distress at the time 





Objective





- Physical Exam


Vitals and I&O: 


 Vital Signs











Temp  97.6 F   18 06:40


 


Pulse  70   18 10:27


 


Resp  21   18 06:40


 


BP  138/70   18 10:27


 


Pulse Ox  99   18 06:40








 Intake & Output











 18





 18:59 06:59 18:59


 


Intake Total 1200 240 


 


Balance 1200 240 


 


Intake:   


 


  Oral 1200 240 


 


Other:   


 


  # Voids 3 1 











Active Medications: 


Current Medications





Acetaminophen (Tylenol)  650 mg PO Q4HR PRN


   PRN Reason: Mild Pain / Temp above 100


   Stop: 18 18:35


Al Hydrox/Mg Hydrox/Simethicone (Maalox)  30 ml PO Q4HR PRN


   PRN Reason: GI DISTRESS


   Stop: 18 18:35


Amlodipine Besylate (Norvasc)  10 mg PO DAILY [facility]


   Stop: 18 08:59


   Last Admin: 18 10:26 Dose:  10 mg


Aspirin (Ecotrin)  81 mg PO DAILY [facility]


   Stop: 18 08:59


   Last Admin: 18 10:27 Dose:  81 mg


Latanoprost (Xalatan 0.005% Ophth Soln)  1 drop EACH EYE HS [facility]


   Stop: 18 20:59


   Last Admin: 18 20:37 Dose:  1 drop


Lorazepam (Ativan)  0.5 mg PO Q4HR PRN; Protocol


   PRN Reason: Anxiety


   Stop: 18 18:35


   Last Admin: 18 10:27 Dose:  0.5 mg


Magnesium Hydroxide (Milk Of Magnesia)  30 ml PO HS PRN


   PRN Reason: Constipation


Metoprolol Tartrate (Lopressor)  25 mg PO BID [facility]


   Stop: 18 08:59


   Last Admin: 18 10:27 Dose:  25 mg


Multivitamins/Vitamin C (Theragran)  1 tab PO DAILY [facility]


   Stop: 18 08:59


   Last Admin: 18 10:27 Dose:  1 tab


Quetiapine Fumarate (Seroquel)  800 mg PO HS [facility]


   PRN Reason: Protocol


   Stop: 18 20:59


   Last Admin: 18 20:36 Dose:  800 mg


Zolpidem Tartrate (Ambien)  5 mg PO HS PRN


   PRN Reason: Insomnia


   Stop: 18 18:35


   Last Admin: 18 21:04 Dose:  5 mg








General: No acute distress


HEENT: Atraumatic


Neck: Supple


Cardiovascular: Regular rate, Normal S1, Normal S2


Lungs: Clear to auscultation


Abdomen: Bowel sounds





Assessment/Plan





- Problem List


Patient Problems: 


All Active Problems





Agitation (Acute) R45.1


HTN (hypertension) (Acute) I10











- Plan


Plan: 





as per psych


will monitor 





Nutritional Asmnt/Malnutr-PDOC





- Dietary Evaluation


Malnutrition Findings (Please click <Entered> for more info): 








Nutritional Asmnt/Malnutrition                             Start:  18 09:

59


Text:                                                      Status: Complete    

  


Freq:                                                                          

  


 Document     18 10:00  ZHANG  (Rec: 18 10:05  ZHANG SINGH-

FNS1)


 Nutritional Asmnt/Malnutrition


     Patient General Information


      Diagnosis                                  Psychosis Nos


      Pertinent Medical Hx/Surgical Hx           HTN


      Subjective Information                     Pt listening to music rec room


                                                 , no nutrition concerns at


                                                 thist time.


      Current Diet Order/ Nutrition Support      Cardiac diet


      Pertinent Medications                      MOM, theragran


      Pertinent Labs                             none noted as of 3/24 @ 1002


     Nutritional Hx/Data


      Height                                     1.8 m


      Height (Calculated Centimeters)            180.3


      Current Weight (lbs)                       81.647 kg


      Weight (Calculated Kilograms)              81.6


      Weight (Calculated Grams)                  13612.6


      Body Mass Index (BMI)                      25.1


      Weight Status                              Overweight


     GI Symptoms


      GI Symptoms                                None


      Last BM                                    3/23


      Food Allergies                             No


      Cultural/Ethnic/Congregation Belief           None noted


      Usual diet at home                         Unable to report


      Skin Integrity/Comment:                    chelita score 22, intact


     Estimated Nutritional Goals


      BEE in Kcals:                              Using Current wt


      Calories/Kcals/Kg                          25kcals/kg


      Kcals Calculated                           2050kcals/day


      Protein:                                   Using Current wt


      Protein g/kg/kg


      Protein Calculated                         82g/day


      Fluid: ml                                  2050ml/day (1ml/kcal)


     Nutritional Problem


      No current Nutrition Prob


       Problem                                   No nutrition concerns at this


                                                 time


     Intervention/Recommendation


      Comments                                   Recommend continuing Cardiac


                                                 dietq


     Expected Outcomes/Goals


      Expected Outcomes/Goals                    PO Intake >75%

## 2018-04-01 NOTE — PROGRESS NOTES
DATE:  03/29/2018



SUBJECTIVE:  Chart reviewed and the patient interviewed.  Also discussed the

patient's condition with the staff and reviewed records and labs.  The patient

seems to be slightly better and seems to be not as argumentative.  The patient

also seems to be more cooperative.  He still had periods of agitation, but

slightly less than before.  He also still seems to be suspicious and is still

slightly paranoid.  The patient continued to comply with taking medications with

no side effects of medications.



ASSESSMENT:  The patient is still psychotic.



TREATMENT PLAN:  We will continue Seroquel 800 mg at bedtime and we will

continue to follow up closely.





DD: 04/01/2018 12:48

DT: 04/01/2018 16:02

JOB# [id number]  [id number]

## 2018-04-01 NOTE — PROGRESS NOTES
DATE:  03/30/2018



SUBJECTIVE:  Chart reviewed and the patient interviewed.  Also discussed the

patient's condition with the staff and reviewed records and labs.  The patient

is still in a depressed mood.  The patient also has been isolating himself and

interacting minimally with others.  On the other hand, the patient seems to be

less agitated and less irritable.  Also, compliant with taking his medications

with no side effect of medications.



ASSESSMENT:  The patient is less irritable and less agitated.



TREATMENT PLAN:  Continue monitoring his behavior and his condition.  Also,

continue Seroquel 800 mg at this time and will continue to follow up.





DD: 04/01/2018 13:32

DT: 04/01/2018 20:47

[facility]# [id number]  [id number]

## 2018-04-02 NOTE — GENERAL PROGRESS NOTE
Subjective





- Review of Systems


Service Date: 18


Subjective: 





denies pain


less anxious and agitated 








Objective





- Physical Exam


Vitals and I&O: 


 Vital Signs











Temp  98.2 F   18 05:58


 


Pulse  65   18 09:05


 


Resp  18   18 05:58


 


BP  158/78   18 09:05


 


Pulse Ox  97   18 05:58








 Intake & Output











 18





 18:59 06:59 18:59


 


Intake Total 1000 240 


 


Balance 1000 240 


 


Intake:   


 


  Oral 1000 240 


 


Other:   


 


  # Voids 4 1 


 


  # Bowel Movements 1  











Active Medications: 


Current Medications





Acetaminophen (Tylenol)  650 mg PO Q4HR PRN


   PRN Reason: Mild Pain / Temp above 100


   Stop: 18 18:35


Al Hydrox/Mg Hydrox/Simethicone (Maalox)  30 ml PO Q4HR PRN


   PRN Reason: GI DISTRESS


   Stop: 18 18:35


Amlodipine Besylate (Norvasc)  10 mg PO DAILY [facility]


   Stop: 18 08:59


   Last Admin: 18 09:05 Dose:  10 mg


Aspirin (Ecotrin)  81 mg PO DAILY [facility]


   Stop: 18 08:59


   Last Admin: 18 09:05 Dose:  81 mg


Latanoprost (Xalatan 0.005% Ophth Soln)  1 drop EACH EYE HS [facility]


   Stop: 18 20:59


   Last Admin: 18 20:28 Dose:  Not Given


Magnesium Hydroxide (Milk Of Magnesia)  30 ml PO HS PRN


   PRN Reason: Constipation


Metoprolol Tartrate (Lopressor)  25 mg PO BID [facility]


   Stop: 18 08:59


   Last Admin: 18 09:05 Dose:  25 mg


Multivitamins/Vitamin C (Theragran)  1 tab PO DAILY [facility]


   Stop: 18 08:59


   Last Admin: 18 09:05 Dose:  1 tab


Quetiapine Fumarate (Seroquel)  800 mg PO HS [facility]


   PRN Reason: Protocol


   Stop: 18 20:59


   Last Admin: 18 20:26 Dose:  800 mg








General: No acute distress


HEENT: Atraumatic


Neck: Supple


Cardiovascular: Regular rate, Normal S1, Normal S2


Lungs: Clear to auscultation


Abdomen: Bowel sounds





Assessment/Plan





- Problem List


Patient Problems: 


All Active Problems





Agitation (Acute) R45.1


HTN (hypertension) (Acute) I10











- Plan


Plan: 





as per psych


will monitor 





Nutritional Asmnt/Malnutr-PDOC





- Dietary Evaluation


Malnutrition Findings (Please click <Entered> for more info): 








Nutritional Asmnt/Malnutrition                             Start:  18 09:

59


Text:                                                      Status: Complete    

  


Freq:                                                                          

  


 Document     18 10:00  ZHANG  (Rec: 18 10:05  EGRIFFITH  SAMANTHA-

FN)


 Nutritional Asmnt/Malnutrition


     Patient General Information


      Diagnosis                                  Psychosis Nos


      Pertinent Medical Hx/Surgical Hx           HTN


      Subjective Information                     Pt listening to music rec room


                                                 , no nutrition concerns at


                                                 thist time.


      Current Diet Order/ Nutrition Support      Cardiac diet


      Pertinent Medications                      MOM, theragran


      Pertinent Labs                             none noted as of 3/24 @ 1002


     Nutritional Hx/Data


      Height                                     1.8 m


      Height (Calculated Centimeters)            180.3


      Current Weight (lbs)                       81.647 kg


      Weight (Calculated Kilograms)              81.6


      Weight (Calculated Grams)                  09213.6


      Body Mass Index (BMI)                      25.1


      Weight Status                              Overweight


     GI Symptoms


      GI Symptoms                                None


      Last BM                                    3/23


      Food Allergies                             No


      Cultural/Ethnic/Samaritan Belief           None noted


      Usual diet at home                         Unable to report


      Skin Integrity/Comment:                    chelita score 22, intact


     Estimated Nutritional Goals


      BEE in Kcals:                              Using Current wt


      Calories/Kcals/Kg                          25kcals/kg


      Kcals Calculated                           2050kcals/day


      Protein:                                   Using Current wt


      Protein g/kg/kg


      Protein Calculated                         82g/day


      Fluid: ml                                  2050ml/day (1ml/kcal)


     Nutritional Problem


      No current Nutrition Prob


       Problem                                   No nutrition concerns at this


                                                 time


     Intervention/Recommendation


      Comments                                   Recommend continuing Cardiac


                                                 dietq


     Expected Outcomes/Goals


      Expected Outcomes/Goals                    PO Intake >75%

## 2018-10-01 ENCOUNTER — HOSPITAL ENCOUNTER (INPATIENT)
Dept: HOSPITAL 54 - GPS | Age: 68
LOS: 10 days | Discharge: NURSING, REHAB OR LONG-TERM CARE | DRG: 885 | End: 2018-10-11
Attending: PSYCHIATRY & NEUROLOGY | Admitting: PSYCHIATRY & NEUROLOGY
Payer: MEDICARE

## 2018-10-01 VITALS — WEIGHT: 180 LBS | HEIGHT: 71 IN | BODY MASS INDEX: 25.2 KG/M2

## 2018-10-01 VITALS — SYSTOLIC BLOOD PRESSURE: 188 MMHG | DIASTOLIC BLOOD PRESSURE: 94 MMHG

## 2018-10-01 VITALS — SYSTOLIC BLOOD PRESSURE: 168 MMHG | DIASTOLIC BLOOD PRESSURE: 93 MMHG

## 2018-10-01 DIAGNOSIS — I10: ICD-10-CM

## 2018-10-01 DIAGNOSIS — F25.9: Primary | ICD-10-CM

## 2018-10-01 DIAGNOSIS — E11.65: ICD-10-CM

## 2018-10-01 DIAGNOSIS — F29: ICD-10-CM

## 2018-10-01 DIAGNOSIS — Z91.14: ICD-10-CM

## 2018-10-01 DIAGNOSIS — Z73.6: ICD-10-CM

## 2018-10-01 PROCEDURE — Z7610: HCPCS

## 2018-10-01 PROCEDURE — A4606 OXYGEN PROBE USED W OXIMETER: HCPCS

## 2018-10-01 RX ADMIN — LORAZEPAM PRN MG: 0.5 TABLET ORAL at 19:52

## 2018-10-01 NOTE — NUR
GPS/RN

STEPHANIE HARDY NOTIFIED REGARDING NEED TO RECONCILE MEDS AND BP /94, AWAITING CALL BACK, NO 
NEW ORDERS AT THIS TIME.

## 2018-10-01 NOTE — NUR
GPS/RN

PATIENT ADMITTED ON A 5150 HOLD FOR DTO AND DTS UNDER THE CARE OF DR GEE AND STEPHANIE HARDY. 
BOTH DR'S AWARE OF NEW ADMISSION. AWAITING STEPHANIE HARDY TO RECONCILE MEDS AND HE IS AWARE OF 
INCREASED BP. PER HOLD PATIENT WAS WALKING AROUND NAKED AND NOT ALLOWING HIS FAMILY MEMBERS 
TO LEAVE THE ROOM. PATIENT ALSO PAINTED HIS APPENDAGES WITH BLACK LATEX PAINT. UPON FACE TO 
FACE ASSESSMENT, PATIENT IS ALERT X 2-3, STABLE CONDITION,ANXIOUS, EASILY AGITATED,  UNKEMPT 
AND DISORGANIZED. BELONGINGS COLLECTED, PATIENT DENIES SI/HI/AVH, PATIENT REFUSED SKIN 
ASSESSMENT X 3, EXPLAINED RISKS AND BENEFITS.  BLACK PAIN IS VISIBLE ON LEGS AND ARMS. WILL 
CONTINUE TO MONITOR Q 15 MIN FOR SAFETY AND BEHAVIOR.

## 2018-10-01 NOTE — NUR
GPS-RN



PATIENT IS VERY ANXIOUS, RESTLESS AND PACING IN AND OUT OF HIS ROOM. ADMINISTERED ATIVAN 1MG 
PO AS ORDERED. WILL CONTINUE TO MONITOR Q15MIN ROUNDS FOR SAFETY AND BEHAVIOR.

## 2018-10-01 NOTE — NUR
GPS/RN

LEFT MESSAGE WITH [employer] FOR TIME ANTONY, REGARDING NEW ADMIT, NEED TO RECONCILE MEDS AND 
INCREASED BP /83, AWAITING CALL BACK.

## 2018-10-02 VITALS — DIASTOLIC BLOOD PRESSURE: 88 MMHG | SYSTOLIC BLOOD PRESSURE: 148 MMHG

## 2018-10-02 VITALS — SYSTOLIC BLOOD PRESSURE: 160 MMHG | DIASTOLIC BLOOD PRESSURE: 87 MMHG

## 2018-10-02 VITALS — DIASTOLIC BLOOD PRESSURE: 88 MMHG | SYSTOLIC BLOOD PRESSURE: 154 MMHG

## 2018-10-02 LAB
ALBUMIN SERPL BCP-MCNC: 3.1 G/DL (ref 3.4–5)
ALP SERPL-CCNC: 92 U/L (ref 46–116)
ALT SERPL W P-5'-P-CCNC: 27 U/L (ref 12–78)
AST SERPL W P-5'-P-CCNC: 41 U/L (ref 15–37)
BASOPHILS # BLD AUTO: 0 /CMM (ref 0–0.2)
BASOPHILS NFR BLD AUTO: 0.3 % (ref 0–2)
BILIRUB SERPL-MCNC: 0.4 MG/DL (ref 0.2–1)
BUN SERPL-MCNC: 25 MG/DL (ref 7–18)
CALCIUM SERPL-MCNC: 8.5 MG/DL (ref 8.5–10.1)
CHLORIDE SERPL-SCNC: 102 MMOL/L (ref 98–107)
CHOLEST SERPL-MCNC: 191 MG/DL (ref ?–200)
CO2 SERPL-SCNC: 28 MMOL/L (ref 21–32)
CREAT SERPL-MCNC: 1.3 MG/DL (ref 0.6–1.3)
EOSINOPHIL NFR BLD AUTO: 11.8 % (ref 0–6)
GLUCOSE SERPL-MCNC: 116 MG/DL (ref 74–106)
HCT VFR BLD AUTO: 37 % (ref 39–51)
HDLC SERPL-MCNC: 81 MG/DL (ref 40–60)
HGB BLD-MCNC: 12.5 G/DL (ref 13.5–17.5)
LDLC SERPL DIRECT ASSAY-MCNC: 105 MG/DL (ref 0–99)
LYMPHOCYTES NFR BLD AUTO: 1.9 /CMM (ref 0.8–4.8)
LYMPHOCYTES NFR BLD AUTO: 34 % (ref 20–44)
MAGNESIUM SERPL-MCNC: 2.1 MG/DL (ref 1.8–2.4)
MCHC RBC AUTO-ENTMCNC: 34 G/DL (ref 31–36)
MCV RBC AUTO: 99 FL (ref 80–96)
MONOCYTES NFR BLD AUTO: 0.7 /CMM (ref 0.1–1.3)
MONOCYTES NFR BLD AUTO: 12 % (ref 2–12)
NEUTROPHILS # BLD AUTO: 2.3 /CMM (ref 1.8–8.9)
NEUTROPHILS NFR BLD AUTO: 41.9 % (ref 43–81)
PHOSPHATE SERPL-MCNC: 3.7 MG/DL (ref 2.5–4.9)
PLATELET # BLD AUTO: 256 /CMM (ref 150–450)
POTASSIUM SERPL-SCNC: 4.1 MMOL/L (ref 3.5–5.1)
PROT SERPL-MCNC: 6.7 G/DL (ref 6.4–8.2)
RBC # BLD AUTO: 3.73 MIL/UL (ref 4.5–6)
RDW COEFFICIENT OF VARIATION: 14.8 (ref 11.5–15)
SODIUM SERPL-SCNC: 137 MMOL/L (ref 136–145)
TRIGL SERPL-MCNC: 39 MG/DL (ref 30–150)
TSH SERPL DL<=0.005 MIU/L-ACNC: 0.94 UIU/ML (ref 0.36–3.74)
WBC NRBC COR # BLD AUTO: 5.5 K/UL (ref 4.3–11)

## 2018-10-02 RX ADMIN — METFORMIN HYDROCHLORIDE SCH MG: 500 TABLET, FILM COATED ORAL at 16:00

## 2018-10-02 RX ADMIN — METOPROLOL SUCCINATE SCH MG: 50 TABLET, EXTENDED RELEASE ORAL at 11:18

## 2018-10-02 RX ADMIN — QUETIAPINE FUMARATE SCH MG: 100 TABLET ORAL at 21:35

## 2018-10-02 RX ADMIN — Medication SCH EACH: at 07:30

## 2018-10-02 RX ADMIN — Medication SCH EACH: at 12:00

## 2018-10-02 RX ADMIN — AMLODIPINE BESYLATE SCH MG: 10 TABLET ORAL at 11:18

## 2018-10-02 RX ADMIN — LORAZEPAM PRN MG: 0.5 TABLET ORAL at 06:59

## 2018-10-02 RX ADMIN — Medication SCH EACH: at 17:30

## 2018-10-02 RX ADMIN — CLONIDINE HYDROCHLORIDE SCH MG: 0.1 TABLET ORAL at 16:00

## 2018-10-02 RX ADMIN — TEMAZEPAM PRN MG: 7.5 CAPSULE ORAL at 22:12

## 2018-10-02 RX ADMIN — CLONIDINE HYDROCHLORIDE SCH MG: 0.1 TABLET ORAL at 11:18

## 2018-10-02 RX ADMIN — Medication SCH EACH: at 21:38

## 2018-10-02 NOTE — NUR
GPS-RN



PATIENT IS VERY ANXIOUS, RESTLESS, PACING IN AND OUT OF HIS ROOM, NEEDS FREQUENT 
REDIRECTION. ADMINISTERED ATIVAN 1MG PO AS ORDERED. WILL CONTINUE TO MONITOR Q15MIN FOR 
SAFETY AND BEHAVIOR.

## 2018-10-02 NOTE — NUR
GPS-RN



PATIENT REFUSED ACCUCHE

-------------------------------------------------------------------------------

Addendum: 10/03/18 at 0614 by LEDA VALE RN

-------------------------------------------------------------------------------

PATIENT REFUSED ACCUCHECK. EDUCATION GIVEN. PATIENT STILL REFUSED. PATIENT STATED "I AM NOT 
DIABETIC".
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
## 2018-10-02 NOTE — NUR
GPS RN NOTE;

PT PACING IN THE HALLWAY RESTLESS, AGITATED, AGGRESSIVE, UNABLE TO CONTROL BEHAVIOR , PT 
THREATENING STAFF,,POINTING  FINGER WILE TALKING, ARGUMENTATIVE, PT REQUESTING A SHOT STATED 
" I ALWAYS GET SHOTS" PT VOLUNTARY TOLD TO GET A SHOT. DR HEATH NOTIFIED WITH ORDER FOR 
HALDOL 5 MG IM ONCE BENADRYL 25 MG IM ONCE ATIVAN 2 MG IM ONCE ORDER PLACED AND CARED OUT.PT 
COOPERATIVELY , NO PHYSICAL HOLD , PT WAS EXPLAIN  OF INJECTABLE MEDICATIONS  PT AWARE AND 
AGREED , VSS , WILL CONTINUE MONITORING FOR SAFETY AND BEHAVIOR Q 15 MIN

## 2018-10-03 VITALS — SYSTOLIC BLOOD PRESSURE: 147 MMHG | DIASTOLIC BLOOD PRESSURE: 71 MMHG

## 2018-10-03 VITALS — SYSTOLIC BLOOD PRESSURE: 185 MMHG | DIASTOLIC BLOOD PRESSURE: 92 MMHG

## 2018-10-03 VITALS — DIASTOLIC BLOOD PRESSURE: 83 MMHG | SYSTOLIC BLOOD PRESSURE: 139 MMHG

## 2018-10-03 RX ADMIN — AMLODIPINE BESYLATE SCH MG: 10 TABLET ORAL at 09:00

## 2018-10-03 RX ADMIN — METFORMIN HYDROCHLORIDE SCH MG: 500 TABLET, FILM COATED ORAL at 09:00

## 2018-10-03 RX ADMIN — METFORMIN HYDROCHLORIDE SCH MG: 500 TABLET, FILM COATED ORAL at 16:18

## 2018-10-03 RX ADMIN — Medication SCH EACH: at 12:00

## 2018-10-03 RX ADMIN — METFORMIN HYDROCHLORIDE SCH MG: 500 TABLET, FILM COATED ORAL at 16:46

## 2018-10-03 RX ADMIN — Medication SCH EACH: at 21:10

## 2018-10-03 RX ADMIN — QUETIAPINE FUMARATE SCH MG: 100 TABLET ORAL at 21:10

## 2018-10-03 RX ADMIN — CLONIDINE HYDROCHLORIDE SCH MG: 0.1 TABLET ORAL at 09:00

## 2018-10-03 RX ADMIN — Medication SCH EACH: at 16:18

## 2018-10-03 RX ADMIN — CLONIDINE HYDROCHLORIDE SCH MG: 0.1 TABLET ORAL at 16:46

## 2018-10-03 RX ADMIN — CLONIDINE HYDROCHLORIDE SCH MG: 0.1 TABLET ORAL at 16:18

## 2018-10-03 RX ADMIN — LORAZEPAM PRN MG: 1 TABLET ORAL at 16:46

## 2018-10-03 RX ADMIN — Medication SCH EACH: at 07:30

## 2018-10-03 RX ADMIN — METOPROLOL SUCCINATE SCH MG: 50 TABLET, EXTENDED RELEASE ORAL at 09:00

## 2018-10-03 RX ADMIN — LORAZEPAM PRN MG: 1 TABLET ORAL at 14:15

## 2018-10-03 NOTE — NUR
PATIENT AGITATED AND ACTING UP. PATIENT ASKED FOR IM INJECTION OF MEDICATIONS. ATIVAN 2MG, 
HALDOL 5MG, AND 25MG BENADRYL GIVEN, WILLINGLY.

## 2018-10-03 NOTE — NUR
PULLING OUT ANOTHER SET OF MEDS TO SEE IF PATIENT WILL TAKE AGAIN. LAST TIME PATIENT DID NOT 
WANT ATIVAN. OPENED AND WASTED.

## 2018-10-03 NOTE — NUR
SW called the pt's sister, Chiquita ([phone]), and was asked for a remote access code 
which the SW does not have. SW will attempt to call again to conduct the psychosocial 
assessment for the pt.

## 2018-10-03 NOTE — NUR
GPS-RN



PATIENT REFUSED ACCUCHECK AND ROUTINE NIGHT MEDICATIONS, PO. EDUCATION GIVEN. PATIENT STILL 
REFUSED.

## 2018-10-03 NOTE — NUR
GPS/RN-NOTES

PATIENT REFUSED ALL 0900AM MEDICATIONS DESPITE EXPLANATIONS RISK AND BENEFITS.PATIENT GETS 
ANGRY THROW ALL MEDICATIONS ON THE FLOOR WITH LOUD VOICE USING FOUL LANGUAGES , THREATENING 
WRITER TO GET OUT OF HIS ROOM AND  ACCUSED WRITER OF NOT GIVING THE RIGHT MEDICATIONS. 
STATED" I DON'T WANT YOU WOMAN TO COME  GIVE ME ANY MEDICATIONS AGAIN,I'M NOT DIABETIC". 
CHARGE NURSE AWARE AND AGREED TO CHANGE THE NURSE FOR THE PATIENT.

## 2018-10-04 VITALS — SYSTOLIC BLOOD PRESSURE: 173 MMHG | DIASTOLIC BLOOD PRESSURE: 68 MMHG

## 2018-10-04 VITALS — SYSTOLIC BLOOD PRESSURE: 195 MMHG | DIASTOLIC BLOOD PRESSURE: 117 MMHG

## 2018-10-04 RX ADMIN — METFORMIN HYDROCHLORIDE SCH MG: 500 TABLET, FILM COATED ORAL at 09:00

## 2018-10-04 RX ADMIN — METFORMIN HYDROCHLORIDE SCH MG: 500 TABLET, FILM COATED ORAL at 17:00

## 2018-10-04 RX ADMIN — CLONIDINE HYDROCHLORIDE SCH MG: 0.1 TABLET ORAL at 09:00

## 2018-10-04 RX ADMIN — Medication SCH EACH: at 21:20

## 2018-10-04 RX ADMIN — DIVALPROEX SODIUM SCH MG: 250 TABLET, DELAYED RELEASE ORAL at 09:00

## 2018-10-04 RX ADMIN — CLONIDINE SCH EA: 0.3 PATCH TRANSDERMAL at 17:00

## 2018-10-04 RX ADMIN — AMLODIPINE BESYLATE SCH MG: 10 TABLET ORAL at 08:51

## 2018-10-04 RX ADMIN — Medication SCH EACH: at 07:30

## 2018-10-04 RX ADMIN — QUETIAPINE FUMARATE SCH MG: 100 TABLET ORAL at 21:20

## 2018-10-04 RX ADMIN — Medication SCH EACH: at 12:00

## 2018-10-04 RX ADMIN — METOPROLOL SUCCINATE SCH MG: 50 TABLET, EXTENDED RELEASE ORAL at 09:00

## 2018-10-04 RX ADMIN — DIVALPROEX SODIUM SCH MG: 250 TABLET, DELAYED RELEASE ORAL at 13:00

## 2018-10-04 RX ADMIN — Medication SCH EACH: at 17:30

## 2018-10-04 RX ADMIN — DIVALPROEX SODIUM SCH MG: 250 TABLET, DELAYED RELEASE ORAL at 17:00

## 2018-10-04 NOTE — NUR
GPS/LVN NOTES:



PT. REFUSED HS ACCUCHECK AND HS MEDS. OFFERED 3X. EXPLAINED RISK AND BENEFITS. PT. STILL 
REFUSED AND GETS AGITATED. WILL CONTINUE TO MONITOR.

## 2018-10-04 NOTE — NUR
GPS/LVN NOTES:



RECEIVED CLONIDINE PATCH FROM PHARMACY. OFFERED TO PT. PT. REFUSED. GETS AGITATED. EXPLAINED 
RISK AND BENEFITS. PT. STILL REFUSED. WILL CONTINUE TO MONITOR.

## 2018-10-04 NOTE — NUR
GPS/RN

PATIENT REFUSED SKIN ASSESSMENT X 3, EXPLAINED RISKS AND BENEFITS, WILL CONTINUE TO 
ENCOURAGE AND EDUCATE ON THE IMPORTANCE OF PROPER HYGIENE.

## 2018-10-04 NOTE — NUR
GPS/RN

PATIENT'S BP /117, PATIENT IS ADAMANTLY REFUSING ANY MEDICATIONS INCLUDING HIS BP 
MEDS. ENCOURAGED X 3 AND EXPLAINED RISKS AND BENEFITS, CONTINUES TO REFUSE, STATED " GET THE 
HELL AWAY FROM WITH THAT SHIT". ASKED IF HE WOULD BE WILLING TO ACCEPT CLONIDINE PATCH AND 
PATIENT STATED " I TOLD YOU, IM NOT TAKING ANY MEDS" WILL NOTIFY MD. PATIENT IS IN DINING 
ROOM WATCHING TV, NO S/S OF DISTRESS AT THIS TIME. WILL CONTINUE TO MONITOR.    

-------------------------------------------------------------------------------

Addendum: 10/04/18 at 1620 by TOI OWENS RN

-------------------------------------------------------------------------------

DR SINGH AWARE OF BP, AWAITING CALL BACK.

## 2018-10-04 NOTE — NUR
GPS/RN

PATIENT ADAMANTLY  REFUSED BS X 3, EXPLAINED RISKS AND BENEFITS, WILL CONTINUE TO ENCOURAGE 
TO COMPLY WITH MD REGIMEN.

## 2018-10-04 NOTE — NUR
SW attempted to conduct the psychosocial assessment with the pt and he stated, "No I don't 
want to talk to you right now. I don't want to talk to you ever. Get out of here." Pt became 
verbally aggressive and the SW left.

## 2018-10-04 NOTE — NUR
SW called the pt's sister, Chiquita ([phone]), and was asked for a remote access code 
which the SW does not have. SW attempted to call again to conduct the psychosocial 
assessment for the pt.

## 2018-10-04 NOTE — NUR
GPS/RN

CLONODINE PATCH STILL NOT AVAILABLE ON UNIT. AWAITING PHARMACY. ENDORSED TO ONCOMING RN TO 
FOLLOW UP IN REGARDS TO CLONODINE PATCH.

## 2018-10-04 NOTE — NUR
Initial Discharge Plan: Pt currently resides with his family in a home located at [address] [place] [address]; ([phone]). SW attempted to call the pt's sister, 
Chiquita, but was unable to make contact. CARLOS will work with the family and the pt regarding 
appropriate discharge planning. SW will form a safe and proper discharge.

## 2018-10-04 NOTE — NUR
GPS/RN

PATIENT ADAMANTLY REFUSED ALL A.M. MEDICATIONS AND BS CHECK  X3, EXPLAINED RISKS AND 
BENEFITS, CONTINUES TO REFUSE. UPON APPROACH PATIENT IS VERBALLY ABUSIVE, ACCUSATORY, LABILE 
WITH POOR BOUNDARIES.  PATIENT REQUESTED  STAFF TO SHOW HIM HOW TO USE THE TELEPHONE IN 
HALLWAY. STAFF PROVIDED PATIENT WITH VERBAL INSTRUCTIONS AND DEMONSTRATION. WILL CONTINUE TO 
ENCOURAGE TO COMPLY WITH MD REGIMEN AND TREATMENTS.

## 2018-10-04 NOTE — NUR
GPS/RN

PHARMACY NOTIFIED X 2 REGARDING INCREASED BP AND  NEED FOR CLONODINE PATCH, STATED THEY 
WOULD BRING PATCH TO UNIT. AWAITING PHARMACY.

## 2018-10-04 NOTE — NUR
GPS/LVN NOTES:



PT. REFUSED HS VITAL SIGNS. OFFERED 3X. EXPLAINED RISK AND BENEFITS. PT. STILL REFUSED.  PT. 
STATED, "NO.  GET THE HELL AWAY FROM ME. "  WILL CONTINUE TO MONITOR.

## 2018-10-04 NOTE — NUR
GPS/RN

PATIENT REFUSED 1700 MEDICATIONS  X 3, EXPLAINED RISKS AND BENEFITS, CONTINUES TO REFUSE AND 
IS VERBALLY ABUSIVE UPON APPROACH. WILL CONTINUE TO ENCOURAGE TO COMPLY WITH MD REGIMEN.

## 2018-10-05 VITALS — DIASTOLIC BLOOD PRESSURE: 88 MMHG | SYSTOLIC BLOOD PRESSURE: 191 MMHG

## 2018-10-05 VITALS — DIASTOLIC BLOOD PRESSURE: 88 MMHG | SYSTOLIC BLOOD PRESSURE: 160 MMHG

## 2018-10-05 VITALS — DIASTOLIC BLOOD PRESSURE: 59 MMHG | SYSTOLIC BLOOD PRESSURE: 157 MMHG

## 2018-10-05 RX ADMIN — DIVALPROEX SODIUM SCH MG: 250 TABLET, DELAYED RELEASE ORAL at 17:58

## 2018-10-05 RX ADMIN — METFORMIN HYDROCHLORIDE SCH MG: 500 TABLET, FILM COATED ORAL at 17:58

## 2018-10-05 RX ADMIN — METOPROLOL SUCCINATE SCH MG: 50 TABLET, EXTENDED RELEASE ORAL at 10:11

## 2018-10-05 RX ADMIN — BENZTROPINE MESYLATE SCH MG: 1 TABLET ORAL at 13:37

## 2018-10-05 RX ADMIN — LORAZEPAM PRN MG: 1 TABLET ORAL at 15:10

## 2018-10-05 RX ADMIN — Medication SCH EACH: at 17:30

## 2018-10-05 RX ADMIN — CLONIDINE SCH EA: 0.3 PATCH TRANSDERMAL at 15:11

## 2018-10-05 RX ADMIN — TEMAZEPAM PRN MG: 7.5 CAPSULE ORAL at 22:11

## 2018-10-05 RX ADMIN — AMLODIPINE BESYLATE SCH MG: 10 TABLET ORAL at 10:11

## 2018-10-05 RX ADMIN — Medication SCH EACH: at 12:00

## 2018-10-05 RX ADMIN — Medication SCH EACH: at 07:30

## 2018-10-05 RX ADMIN — METFORMIN HYDROCHLORIDE SCH MG: 500 TABLET, FILM COATED ORAL at 09:00

## 2018-10-05 RX ADMIN — HALOPERIDOL SCH MG: 5 TABLET ORAL at 17:58

## 2018-10-05 RX ADMIN — LORAZEPAM PRN MG: 1 TABLET ORAL at 10:10

## 2018-10-05 RX ADMIN — DIVALPROEX SODIUM SCH MG: 250 TABLET, DELAYED RELEASE ORAL at 10:10

## 2018-10-05 RX ADMIN — Medication SCH EACH: at 22:00

## 2018-10-05 RX ADMIN — DIVALPROEX SODIUM SCH MG: 250 TABLET, DELAYED RELEASE ORAL at 13:37

## 2018-10-05 RX ADMIN — HALOPERIDOL SCH MG: 5 TABLET ORAL at 13:37

## 2018-10-05 RX ADMIN — BENZTROPINE MESYLATE SCH MG: 1 TABLET ORAL at 17:58

## 2018-10-05 NOTE — NUR
SW also informed Chiquita ([phone]), pt's sister, that the pt is having a [place] hearing 
today due to the pt refusing to take his medications. The pt's sister asked the SW to let 
her know what the results of the hearing are.

## 2018-10-05 NOTE — NUR
GPS/RN-NOTES

NOTED PATIENT WITH DEMANDING, PACING IN THE HALLWAY TO HIS ROOM,TALKING WITH LOUD VOICE. 
ATIVAN 1MG P.O GIVE AS PRN ORDER. ON 1:1 MONITORING FOR SAFETY AND BEHAVIOR.

## 2018-10-05 NOTE — NUR
GPS/LVN NOTES:



NOTICED PT. EATING SOAP IN PT.S BATHROOM SINK AND SCRAPING THE SCHEDULING BOARD IN HIS ROOM. 
PT. ALSO WALKING ROOM TO ROOM IN OTHER PTS. ROOM. REDIRECTED. PT. NEEDS CONSTANT REDIRECTION 
AND OBSERVATION.

## 2018-10-05 NOTE — NUR
Chiquita ([phone]), pt's sister, called the SW and provided her with a better contact 
number. Pt's sister informed the SW that she will be getting a restraining order on the pt 
and that he cannot be discharged back home. SW and the pt's sister then discussed a 
potential plan of having the pt go to a skilled nursing facility or a board and care 
facility.

## 2018-10-05 NOTE — NUR
GPS/RN-NOTES

PATIENT UNABLE TO SIT STILL ,HYPERVERBAL WITH LOUD VOICE. ATIVAN 1MG P.O GIVEN AS PRN ORDER. 
ON 1:1  MONITORING FOR SAFETY AND BEHAVIOR.

## 2018-10-06 VITALS — SYSTOLIC BLOOD PRESSURE: 153 MMHG | DIASTOLIC BLOOD PRESSURE: 75 MMHG

## 2018-10-06 VITALS — DIASTOLIC BLOOD PRESSURE: 91 MMHG | SYSTOLIC BLOOD PRESSURE: 181 MMHG

## 2018-10-06 RX ADMIN — Medication SCH EACH: at 07:27

## 2018-10-06 RX ADMIN — BENZTROPINE MESYLATE SCH MG: 1 TABLET ORAL at 08:51

## 2018-10-06 RX ADMIN — Medication SCH EACH: at 17:03

## 2018-10-06 RX ADMIN — HALOPERIDOL SCH MG: 5 TABLET ORAL at 08:51

## 2018-10-06 RX ADMIN — LORAZEPAM PRN MG: 1 TABLET ORAL at 20:14

## 2018-10-06 RX ADMIN — AMLODIPINE BESYLATE SCH MG: 10 TABLET ORAL at 08:52

## 2018-10-06 RX ADMIN — Medication SCH EACH: at 21:44

## 2018-10-06 RX ADMIN — HALOPERIDOL SCH MG: 5 TABLET ORAL at 12:12

## 2018-10-06 RX ADMIN — DIVALPROEX SODIUM SCH MG: 250 TABLET, DELAYED RELEASE ORAL at 17:02

## 2018-10-06 RX ADMIN — DIVALPROEX SODIUM SCH MG: 250 TABLET, DELAYED RELEASE ORAL at 08:51

## 2018-10-06 RX ADMIN — METFORMIN HYDROCHLORIDE SCH MG: 500 TABLET, FILM COATED ORAL at 17:02

## 2018-10-06 RX ADMIN — METFORMIN HYDROCHLORIDE SCH MG: 500 TABLET, FILM COATED ORAL at 08:50

## 2018-10-06 RX ADMIN — BENZTROPINE MESYLATE SCH MG: 1 TABLET ORAL at 17:02

## 2018-10-06 RX ADMIN — DIVALPROEX SODIUM SCH MG: 250 TABLET, DELAYED RELEASE ORAL at 12:12

## 2018-10-06 RX ADMIN — BENZTROPINE MESYLATE SCH MG: 1 TABLET ORAL at 12:12

## 2018-10-06 RX ADMIN — HALOPERIDOL SCH MG: 5 TABLET ORAL at 17:02

## 2018-10-06 RX ADMIN — Medication SCH EACH: at 12:00

## 2018-10-06 RX ADMIN — METOPROLOL SUCCINATE SCH MG: 50 TABLET, EXTENDED RELEASE ORAL at 08:51

## 2018-10-06 NOTE — NUR
GPS RN NOTE, PATIENT HAS A COMPLAINT OF FEELING ANXIOUS AND IS REQUESTING ATIVAN AT THIS 
TIME.  PATIENT VITAL SIGNS ARE STABLE.  GAVE ATIVAN 1 MG PO Q4HR PRN AS ORDERED WILL 
REASSESS FOR ANXIETY AND I WILL CONTINUE TO MONITOR THIS PATIENT.

## 2018-10-06 NOTE — NUR
GPS RN NOTE, PERFORMED ACCU-CHECK ON PATIENT WITH A BLOOD SUGAR RESULT .  NO INSULIN 
COVERAGE GIVEN PER SLIDING SCALE. WILL CONTINUE TO MONITOR THIS PATIENT.

## 2018-10-06 NOTE — NUR
GPS RN NOTE, RECEIVED PATIENT AWAKE AND IN BED, NO S/S OR COMPLAINTS OF PAIN AT THIS TIME.  
PATIENT IS DISPLAYING NO S/S OF APPARENT DISTRESS AT THIS TIME.  PATIENT BREATHING IS 
UNLABORED WITH EQUAL RISE AND FALL OF THE CHEST.  PATIENT IS ALERT AND ORIENTED X 2 ON ROOM 
AIR WITH A SPO2 OF 99%.  PATIENT IS MED SELECTIVE, DISORGANIZED, CONFUSED AT TIMES, AND 
NEEDS REDIRECTION.  PATIENT DENIES SUICIDE IDEATIONS AND HOMICIDAL IDEATIONS AT THIS TIME.  
PATIENT ASSISTED WITH TURNING AND REPOSITIONING Q 2HRS AND PRN FOR COMFORT AND CIRCULATION.  
PATIENT HAS NO NEEDS AT THIS TIME.  PATIENT EDUCATED ON THE USE OF THE CALL BELL.  PATIENT 
BED SIDE RAILS UP X 2 FOR SAFETY, BED IS LOCKED, LOW, AND I WILL CONTINUE TO MONITOR AND 
MAINTAIN SAFETY Q15 MIN WITH THE HELP OF STAFF.

## 2018-10-07 VITALS — DIASTOLIC BLOOD PRESSURE: 75 MMHG | SYSTOLIC BLOOD PRESSURE: 153 MMHG

## 2018-10-07 VITALS — SYSTOLIC BLOOD PRESSURE: 155 MMHG | DIASTOLIC BLOOD PRESSURE: 90 MMHG

## 2018-10-07 VITALS — SYSTOLIC BLOOD PRESSURE: 154 MMHG | DIASTOLIC BLOOD PRESSURE: 78 MMHG

## 2018-10-07 RX ADMIN — METFORMIN HYDROCHLORIDE SCH MG: 500 TABLET, FILM COATED ORAL at 08:19

## 2018-10-07 RX ADMIN — AMLODIPINE BESYLATE SCH MG: 10 TABLET ORAL at 08:19

## 2018-10-07 RX ADMIN — DIVALPROEX SODIUM SCH MG: 250 TABLET, DELAYED RELEASE ORAL at 12:07

## 2018-10-07 RX ADMIN — BENZTROPINE MESYLATE SCH MG: 1 TABLET ORAL at 17:08

## 2018-10-07 RX ADMIN — HALOPERIDOL SCH MG: 5 TABLET ORAL at 17:08

## 2018-10-07 RX ADMIN — TEMAZEPAM PRN MG: 7.5 CAPSULE ORAL at 21:55

## 2018-10-07 RX ADMIN — METOPROLOL SUCCINATE SCH MG: 50 TABLET, EXTENDED RELEASE ORAL at 08:20

## 2018-10-07 RX ADMIN — DIVALPROEX SODIUM SCH MG: 250 TABLET, DELAYED RELEASE ORAL at 08:19

## 2018-10-07 RX ADMIN — Medication SCH EACH: at 11:42

## 2018-10-07 RX ADMIN — BENZTROPINE MESYLATE SCH MG: 1 TABLET ORAL at 12:07

## 2018-10-07 RX ADMIN — Medication SCH EACH: at 08:18

## 2018-10-07 RX ADMIN — DIVALPROEX SODIUM SCH MG: 250 TABLET, DELAYED RELEASE ORAL at 17:08

## 2018-10-07 RX ADMIN — HALOPERIDOL SCH MG: 5 TABLET ORAL at 12:07

## 2018-10-07 RX ADMIN — Medication SCH EACH: at 16:58

## 2018-10-07 RX ADMIN — HALOPERIDOL SCH MG: 5 TABLET ORAL at 08:19

## 2018-10-07 RX ADMIN — METFORMIN HYDROCHLORIDE SCH MG: 500 TABLET, FILM COATED ORAL at 17:08

## 2018-10-07 RX ADMIN — Medication SCH EACH: at 22:07

## 2018-10-07 RX ADMIN — BENZTROPINE MESYLATE SCH MG: 1 TABLET ORAL at 08:19

## 2018-10-08 VITALS — DIASTOLIC BLOOD PRESSURE: 74 MMHG | SYSTOLIC BLOOD PRESSURE: 133 MMHG

## 2018-10-08 VITALS — SYSTOLIC BLOOD PRESSURE: 154 MMHG | DIASTOLIC BLOOD PRESSURE: 81 MMHG

## 2018-10-08 VITALS — SYSTOLIC BLOOD PRESSURE: 154 MMHG | DIASTOLIC BLOOD PRESSURE: 77 MMHG

## 2018-10-08 RX ADMIN — LORAZEPAM PRN MG: 1 TABLET ORAL at 10:57

## 2018-10-08 RX ADMIN — BENZTROPINE MESYLATE SCH MG: 1 TABLET ORAL at 08:06

## 2018-10-08 RX ADMIN — Medication SCH EACH: at 08:05

## 2018-10-08 RX ADMIN — BENZTROPINE MESYLATE SCH MG: 1 TABLET ORAL at 12:01

## 2018-10-08 RX ADMIN — DIVALPROEX SODIUM SCH MG: 250 TABLET, DELAYED RELEASE ORAL at 16:08

## 2018-10-08 RX ADMIN — METFORMIN HYDROCHLORIDE SCH MG: 500 TABLET, FILM COATED ORAL at 08:07

## 2018-10-08 RX ADMIN — Medication SCH EACH: at 21:36

## 2018-10-08 RX ADMIN — Medication SCH EACH: at 12:00

## 2018-10-08 RX ADMIN — METOPROLOL SUCCINATE SCH MG: 50 TABLET, EXTENDED RELEASE ORAL at 08:07

## 2018-10-08 RX ADMIN — HALOPERIDOL SCH MG: 5 TABLET ORAL at 12:01

## 2018-10-08 RX ADMIN — METFORMIN HYDROCHLORIDE SCH MG: 500 TABLET, FILM COATED ORAL at 16:08

## 2018-10-08 RX ADMIN — DIVALPROEX SODIUM SCH MG: 250 TABLET, DELAYED RELEASE ORAL at 08:06

## 2018-10-08 RX ADMIN — BENZTROPINE MESYLATE SCH MG: 1 TABLET ORAL at 16:08

## 2018-10-08 RX ADMIN — TEMAZEPAM PRN MG: 7.5 CAPSULE ORAL at 23:43

## 2018-10-08 RX ADMIN — Medication SCH EACH: at 17:15

## 2018-10-08 RX ADMIN — AMLODIPINE BESYLATE SCH MG: 10 TABLET ORAL at 08:07

## 2018-10-08 RX ADMIN — DIVALPROEX SODIUM SCH MG: 250 TABLET, DELAYED RELEASE ORAL at 12:01

## 2018-10-08 RX ADMIN — HALOPERIDOL SCH MG: 5 TABLET ORAL at 16:08

## 2018-10-08 RX ADMIN — HALOPERIDOL SCH MG: 5 TABLET ORAL at 08:06

## 2018-10-08 NOTE — NUR
PS-RN



PATIENT IS ANXIOUS, RESTLESS,  NEEDS FREQUENT REDIRECTION. ADMINISTERED ATIVAN 1MG PO AS 
ORDERED. WILL CONTINUE TO MONITOR Q15MIN FOR SAFETY AND BEHAVIOR.

## 2018-10-08 NOTE — NUR
PSYCH RN NOTES,



PATIENT REFUSED ACCUCHECK, RISKS AND BENEFITS EXPLAINED 3X, STILL REFUSED, WILL CONTINUE TO 
MONITOR CLOSELY.

## 2018-10-08 NOTE — NUR
SW attempted to conduct a substance abuse intervention with the pt but he presented as being 
guarded and was verbally aggressive with the SW. The SW will attempt again the following 
day.

## 2018-10-09 VITALS — SYSTOLIC BLOOD PRESSURE: 157 MMHG | DIASTOLIC BLOOD PRESSURE: 80 MMHG

## 2018-10-09 VITALS — SYSTOLIC BLOOD PRESSURE: 142 MMHG | DIASTOLIC BLOOD PRESSURE: 74 MMHG

## 2018-10-09 VITALS — SYSTOLIC BLOOD PRESSURE: 149 MMHG | DIASTOLIC BLOOD PRESSURE: 99 MMHG

## 2018-10-09 VITALS — DIASTOLIC BLOOD PRESSURE: 80 MMHG | SYSTOLIC BLOOD PRESSURE: 157 MMHG

## 2018-10-09 RX ADMIN — METOPROLOL SUCCINATE SCH MG: 50 TABLET, EXTENDED RELEASE ORAL at 08:25

## 2018-10-09 RX ADMIN — METFORMIN HYDROCHLORIDE SCH MG: 500 TABLET, FILM COATED ORAL at 17:24

## 2018-10-09 RX ADMIN — TEMAZEPAM PRN MG: 7.5 CAPSULE ORAL at 22:36

## 2018-10-09 RX ADMIN — AMLODIPINE BESYLATE SCH MG: 10 TABLET ORAL at 08:24

## 2018-10-09 RX ADMIN — Medication SCH EACH: at 12:06

## 2018-10-09 RX ADMIN — Medication SCH EACH: at 08:22

## 2018-10-09 RX ADMIN — DIVALPROEX SODIUM SCH MG: 250 TABLET, DELAYED RELEASE ORAL at 12:08

## 2018-10-09 RX ADMIN — BENZTROPINE MESYLATE SCH MG: 1 TABLET ORAL at 17:24

## 2018-10-09 RX ADMIN — Medication SCH EACH: at 17:25

## 2018-10-09 RX ADMIN — DIVALPROEX SODIUM SCH MG: 250 TABLET, DELAYED RELEASE ORAL at 08:23

## 2018-10-09 RX ADMIN — BENZTROPINE MESYLATE SCH MG: 1 TABLET ORAL at 12:09

## 2018-10-09 RX ADMIN — Medication SCH EACH: at 21:51

## 2018-10-09 RX ADMIN — HALOPERIDOL SCH MG: 5 TABLET ORAL at 12:09

## 2018-10-09 RX ADMIN — HALOPERIDOL SCH MG: 5 TABLET ORAL at 17:24

## 2018-10-09 RX ADMIN — LORAZEPAM PRN MG: 1 TABLET ORAL at 11:11

## 2018-10-09 RX ADMIN — METFORMIN HYDROCHLORIDE SCH MG: 500 TABLET, FILM COATED ORAL at 08:23

## 2018-10-09 RX ADMIN — DIVALPROEX SODIUM SCH MG: 250 TABLET, DELAYED RELEASE ORAL at 17:25

## 2018-10-09 RX ADMIN — HALOPERIDOL SCH MG: 5 TABLET ORAL at 08:23

## 2018-10-09 RX ADMIN — BENZTROPINE MESYLATE SCH MG: 1 TABLET ORAL at 08:23

## 2018-10-09 NOTE — NUR
GPS-RN-NOTES: GAVE ATIVAN 1 MG PO DUE TO INCREASED ANXIETY UPON PT REQUEST AND WILL CONTINUE 
TO MONITOR FOR EFFECTIVENESS OF MEDICATION

## 2018-10-10 VITALS — DIASTOLIC BLOOD PRESSURE: 98 MMHG | SYSTOLIC BLOOD PRESSURE: 163 MMHG

## 2018-10-10 VITALS — SYSTOLIC BLOOD PRESSURE: 158 MMHG | DIASTOLIC BLOOD PRESSURE: 87 MMHG

## 2018-10-10 VITALS — SYSTOLIC BLOOD PRESSURE: 145 MMHG | DIASTOLIC BLOOD PRESSURE: 75 MMHG

## 2018-10-10 RX ADMIN — HALOPERIDOL SCH MG: 5 TABLET ORAL at 12:02

## 2018-10-10 RX ADMIN — BENZTROPINE MESYLATE SCH MG: 1 TABLET ORAL at 16:02

## 2018-10-10 RX ADMIN — METFORMIN HYDROCHLORIDE SCH MG: 500 TABLET, FILM COATED ORAL at 08:25

## 2018-10-10 RX ADMIN — BENZTROPINE MESYLATE SCH MG: 1 TABLET ORAL at 12:02

## 2018-10-10 RX ADMIN — Medication SCH EACH: at 12:00

## 2018-10-10 RX ADMIN — Medication SCH EACH: at 07:40

## 2018-10-10 RX ADMIN — AMLODIPINE BESYLATE SCH MG: 10 TABLET ORAL at 08:25

## 2018-10-10 RX ADMIN — METOPROLOL SUCCINATE SCH MG: 50 TABLET, EXTENDED RELEASE ORAL at 08:25

## 2018-10-10 RX ADMIN — LORAZEPAM PRN MG: 1 TABLET ORAL at 21:00

## 2018-10-10 RX ADMIN — BENZTROPINE MESYLATE SCH MG: 1 TABLET ORAL at 08:25

## 2018-10-10 RX ADMIN — DIVALPROEX SODIUM SCH MG: 250 TABLET, DELAYED RELEASE ORAL at 12:02

## 2018-10-10 RX ADMIN — DIVALPROEX SODIUM SCH MG: 250 TABLET, DELAYED RELEASE ORAL at 08:25

## 2018-10-10 RX ADMIN — TEMAZEPAM PRN MG: 7.5 CAPSULE ORAL at 21:45

## 2018-10-10 RX ADMIN — Medication SCH EACH: at 21:12

## 2018-10-10 RX ADMIN — LORAZEPAM PRN MG: 1 TABLET ORAL at 08:25

## 2018-10-10 RX ADMIN — Medication SCH EACH: at 17:32

## 2018-10-10 RX ADMIN — HALOPERIDOL SCH MG: 5 TABLET ORAL at 16:02

## 2018-10-10 RX ADMIN — METFORMIN HYDROCHLORIDE SCH MG: 500 TABLET, FILM COATED ORAL at 16:02

## 2018-10-10 RX ADMIN — DIVALPROEX SODIUM SCH MG: 250 TABLET, DELAYED RELEASE ORAL at 16:02

## 2018-10-10 RX ADMIN — HALOPERIDOL SCH MG: 5 TABLET ORAL at 08:25

## 2018-10-10 NOTE — NUR
GPS-RN-NOTES:

 ATIVAN 1 MG PO GIVEN  DUE TO INCREASED ANXIETY UPON PT REQUEST AND WILL CONTINUE TO MONITOR 
FOR EFFECTIVENESS OF MEDICATION

## 2018-10-10 NOTE — NUR
CARLOS called Chiquita ([phone]), pt's sister, and informed her that the pt is going to be 
discharged to [facility] ([facility]) and provided with her with the address and 
the phone number.

## 2018-10-10 NOTE — NUR
GPS-RN



PATIENT IS ANXIOUS AND RESTLESS. ADMINISTERED ATIVAN 1MG PO AS ORDERED PER PATIENT'S 
REQUEST. WILL CONTINUE TO MONITOR Q15MIN FOR SAFETY AND BEHAVIOR.

## 2018-10-11 VITALS — SYSTOLIC BLOOD PRESSURE: 196 MMHG | DIASTOLIC BLOOD PRESSURE: 101 MMHG

## 2018-10-11 VITALS — SYSTOLIC BLOOD PRESSURE: 164 MMHG | DIASTOLIC BLOOD PRESSURE: 70 MMHG

## 2018-10-11 RX ADMIN — BENZTROPINE MESYLATE SCH MG: 1 TABLET ORAL at 12:18

## 2018-10-11 RX ADMIN — DIVALPROEX SODIUM SCH MG: 250 TABLET, DELAYED RELEASE ORAL at 08:31

## 2018-10-11 RX ADMIN — LORAZEPAM PRN MG: 1 TABLET ORAL at 06:37

## 2018-10-11 RX ADMIN — Medication SCH EACH: at 12:00

## 2018-10-11 RX ADMIN — METOPROLOL SUCCINATE SCH MG: 50 TABLET, EXTENDED RELEASE ORAL at 08:32

## 2018-10-11 RX ADMIN — HALOPERIDOL SCH MG: 5 TABLET ORAL at 08:32

## 2018-10-11 RX ADMIN — Medication SCH EACH: at 08:31

## 2018-10-11 RX ADMIN — BENZTROPINE MESYLATE SCH MG: 1 TABLET ORAL at 08:32

## 2018-10-11 RX ADMIN — DIVALPROEX SODIUM SCH MG: 250 TABLET, DELAYED RELEASE ORAL at 12:19

## 2018-10-11 RX ADMIN — METFORMIN HYDROCHLORIDE SCH MG: 500 TABLET, FILM COATED ORAL at 08:31

## 2018-10-11 RX ADMIN — LORAZEPAM PRN MG: 1 TABLET ORAL at 12:18

## 2018-10-11 RX ADMIN — AMLODIPINE BESYLATE SCH MG: 10 TABLET ORAL at 08:33

## 2018-10-11 RX ADMIN — HALOPERIDOL SCH MG: 5 TABLET ORAL at 12:18

## 2018-10-11 NOTE — NUR
CARLOS called Chiquita ([phone]), pt's sister, and asked her to convince the pt to go to the 
SNF.

## 2018-10-11 NOTE — NUR
GPS-RN



PATIENT C/O FEELING ANXIOUS. ADMINISTERED ATIVAN 1MG PO AS ORDERED PER PATIENT'S REQUEST. 
WILL CONTINUE TO MONITOR Q15MIN FOR SAFETY AND BEHAVIOR.

## 2018-10-11 NOTE — NUR
DR. GRIMM GAVE AN ORDER TO D/C HOLD AND D/C TO [facility] AND TO FOLLOW 
UP WITH PSYCH AND MEDICAL DOCTORS.

## 2018-10-11 NOTE — NUR
GPS/RN /101 P78.  NAHUM RIVERA NP MADE AWARE NEW ORDERS RECEIVED AND CARRIED OUT. 
AMBULANCE IS ON WILL CALL TILL BP STABILIZED. REPORT GIVEN TO [facility] 
NURSING.PT IS ANXIOUS REFUSED ACCUCHECK  AND TO SIGN DISCHARGE PAPERWORK.OFFERED X3. NO SI 
OR HI AT THIS TIME. WILL CONTINUE TO MONITOR.

## 2018-10-11 NOTE — NUR
GP/RN  AMBULANCE HERE TO  PT. VSS. PT IS AMBULATORY NO ACUTE DISTRESS NOTED. REFUSED 
TO SIGN THE D/C PAPERWORK/EXIT CARE. PROPERTY RETURNED. PT NOT HAVING ANY SI OR HI AT THE 
TIME OF DISCHARGE. SABINA REYES [facility]  MADE  AWARE OF PT'S DELAY OF .

## 2018-10-11 NOTE — NUR
Discharge Note: Pt was discharged to [facility] ([facility]) located at [address] [place] [id number]; ([phone]). Pt was transported via [facility] 
(Trip #[id number]) at 4PM. Upon discharge, the pt was in a dysphoric mood with an irritated 
affect. Pt denied having any suicidal or homicidal ideation as well as any visual and 
auditory hallucinations. Pt was provided with three substance abuse referrals as well as 
with two smoking cessation referrals. Pt will be under the care of psychiatrist, Dr. Rose, located at [address] [address], [place] [address]; [phone] and internist, Dr. Cornelio Church, located at [address] [place] 
[address]; [phone].



Three Substance Abuse referrals:

[facility]

[address]

 [place] [address]

 Tel. [phone]

[facility]

Primary Care

[facility] [place] Provider

Mental Health Treatment

Tele-dermatology

HIV Services

Telemedicine Services

[facility]

[address] [place] [address]

Phone: [phone]

[facility] 

[address] [place] [address]

Phone: [phone]



Smoking cessation referrals: 

[employer] 

800-LUNGUSA

  [employer] 

[phone]

## 2020-01-28 NOTE — NUR
GPS/RN

DR SINGH WAS NOTIFIED REGARDING BP /117 AND REFUSAL OF ALL MEDICATIONS DURING SHIFT. NEW 
ORDER OF CLONODINE PATCH 0.3MG, WILL INPUT ORDER IN SYSTEM AND OFFER PATCH TO PATIENT. Time Spent Counseling (Min): 3 Detail Level: Detailed
